# Patient Record
Sex: FEMALE | Race: WHITE | NOT HISPANIC OR LATINO | Employment: OTHER | URBAN - METROPOLITAN AREA
[De-identification: names, ages, dates, MRNs, and addresses within clinical notes are randomized per-mention and may not be internally consistent; named-entity substitution may affect disease eponyms.]

---

## 2021-09-16 ENCOUNTER — NURSE TRIAGE (OUTPATIENT)
Dept: NURSING | Facility: CLINIC | Age: 66
End: 2021-09-16

## 2021-09-16 ENCOUNTER — HOSPITAL ENCOUNTER (EMERGENCY)
Facility: CLINIC | Age: 66
Discharge: ANOTHER HOSPITAL | End: 2021-09-16
Attending: EMERGENCY MEDICINE | Admitting: EMERGENCY MEDICINE
Payer: COMMERCIAL

## 2021-09-16 ENCOUNTER — APPOINTMENT (OUTPATIENT)
Dept: GENERAL RADIOLOGY | Facility: CLINIC | Age: 66
End: 2021-09-16
Attending: EMERGENCY MEDICINE
Payer: COMMERCIAL

## 2021-09-16 ENCOUNTER — TELEPHONE (OUTPATIENT)
Dept: FAMILY MEDICINE | Facility: CLINIC | Age: 66
End: 2021-09-16

## 2021-09-16 ENCOUNTER — APPOINTMENT (OUTPATIENT)
Dept: CT IMAGING | Facility: CLINIC | Age: 66
End: 2021-09-16
Attending: EMERGENCY MEDICINE
Payer: COMMERCIAL

## 2021-09-16 ENCOUNTER — HOSPITAL ENCOUNTER (INPATIENT)
Facility: CLINIC | Age: 66
LOS: 6 days | Discharge: HOME | DRG: 177 | End: 2021-09-22
Attending: INTERNAL MEDICINE | Admitting: INTERNAL MEDICINE
Payer: COMMERCIAL

## 2021-09-16 VITALS
WEIGHT: 199 LBS | DIASTOLIC BLOOD PRESSURE: 59 MMHG | SYSTOLIC BLOOD PRESSURE: 130 MMHG | HEART RATE: 78 BPM | TEMPERATURE: 98.5 F | OXYGEN SATURATION: 93 % | RESPIRATION RATE: 25 BRPM

## 2021-09-16 DIAGNOSIS — J12.82 PNEUMONIA DUE TO 2019 NOVEL CORONAVIRUS: ICD-10-CM

## 2021-09-16 DIAGNOSIS — R09.02 HYPOXEMIA: ICD-10-CM

## 2021-09-16 DIAGNOSIS — U07.1 COVID-19: Primary | ICD-10-CM

## 2021-09-16 DIAGNOSIS — U07.1 PNEUMONIA DUE TO 2019 NOVEL CORONAVIRUS: ICD-10-CM

## 2021-09-16 LAB
ALBUMIN SERPL-MCNC: 3.2 G/DL (ref 3.4–5)
ALP SERPL-CCNC: 39 U/L (ref 40–150)
ALT SERPL W P-5'-P-CCNC: 41 U/L (ref 0–50)
ANION GAP SERPL CALCULATED.3IONS-SCNC: 10 MMOL/L (ref 3–14)
AST SERPL W P-5'-P-CCNC: 40 U/L (ref 0–45)
BASE EXCESS BLDV CALC-SCNC: 3.3 MMOL/L (ref -7.7–1.9)
BASOPHILS # BLD AUTO: 0 10E3/UL (ref 0–0.2)
BASOPHILS NFR BLD AUTO: 0 %
BILIRUB SERPL-MCNC: 0.9 MG/DL (ref 0.2–1.3)
BUN SERPL-MCNC: 13 MG/DL (ref 7–30)
CALCIUM SERPL-MCNC: 8 MG/DL (ref 8.5–10.1)
CHLORIDE BLD-SCNC: 99 MMOL/L (ref 94–109)
CO2 SERPL-SCNC: 24 MMOL/L (ref 20–32)
CREAT SERPL-MCNC: 0.77 MG/DL (ref 0.52–1.04)
CRP SERPL-MCNC: 95.2 MG/L (ref 0–8)
D DIMER PPP FEU-MCNC: 2.12 UG/ML FEU (ref 0–0.5)
EOSINOPHIL # BLD AUTO: 0 10E3/UL (ref 0–0.7)
EOSINOPHIL NFR BLD AUTO: 0 %
ERYTHROCYTE [DISTWIDTH] IN BLOOD BY AUTOMATED COUNT: 13 % (ref 10–15)
FERRITIN SERPL-MCNC: 967 NG/ML (ref 8–252)
GFR SERPL CREATININE-BSD FRML MDRD: 80 ML/MIN/1.73M2
GLUCOSE BLD-MCNC: 101 MG/DL (ref 70–99)
HCO3 BLDV-SCNC: 28 MMOL/L (ref 21–28)
HCT VFR BLD AUTO: 42.4 % (ref 35–47)
HGB BLD-MCNC: 14.5 G/DL (ref 11.7–15.7)
IMM GRANULOCYTES # BLD: 0 10E3/UL
IMM GRANULOCYTES NFR BLD: 0 %
LACTATE SERPL-SCNC: 1.8 MMOL/L (ref 0.7–2)
LDH SERPL L TO P-CCNC: 386 U/L (ref 81–234)
LYMPHOCYTES # BLD AUTO: 1 10E3/UL (ref 0.8–5.3)
LYMPHOCYTES NFR BLD AUTO: 12 %
MAGNESIUM SERPL-MCNC: 2.5 MG/DL (ref 1.6–2.3)
MCH RBC QN AUTO: 30.1 PG (ref 26.5–33)
MCHC RBC AUTO-ENTMCNC: 34.2 G/DL (ref 31.5–36.5)
MCV RBC AUTO: 88 FL (ref 78–100)
MONOCYTES # BLD AUTO: 0.7 10E3/UL (ref 0–1.3)
MONOCYTES NFR BLD AUTO: 8 %
NEUTROPHILS # BLD AUTO: 6.8 10E3/UL (ref 1.6–8.3)
NEUTROPHILS NFR BLD AUTO: 80 %
NRBC # BLD AUTO: 0 10E3/UL
NRBC BLD AUTO-RTO: 0 /100
O2/TOTAL GAS SETTING VFR VENT: 0 %
PCO2 BLDV: 43 MM HG (ref 40–50)
PH BLDV: 7.43 [PH] (ref 7.32–7.43)
PHOSPHATE SERPL-MCNC: 2.8 MG/DL (ref 2.5–4.5)
PLATELET # BLD AUTO: 206 10E3/UL (ref 150–450)
PO2 BLDV: 28 MM HG (ref 25–47)
POTASSIUM BLD-SCNC: 3.8 MMOL/L (ref 3.4–5.3)
PROCALCITONIN SERPL-MCNC: <0.05 NG/ML
PROT SERPL-MCNC: 7.5 G/DL (ref 6.8–8.8)
RBC # BLD AUTO: 4.81 10E6/UL (ref 3.8–5.2)
SARS-COV-2 RNA RESP QL NAA+PROBE: POSITIVE
SODIUM SERPL-SCNC: 133 MMOL/L (ref 133–144)
TROPONIN I SERPL-MCNC: <0.015 UG/L (ref 0–0.04)
WBC # BLD AUTO: 8.5 10E3/UL (ref 4–11)

## 2021-09-16 PROCEDURE — 86140 C-REACTIVE PROTEIN: CPT | Performed by: EMERGENCY MEDICINE

## 2021-09-16 PROCEDURE — 99285 EMERGENCY DEPT VISIT HI MDM: CPT | Performed by: EMERGENCY MEDICINE

## 2021-09-16 PROCEDURE — 250N000011 HC RX IP 250 OP 636: Performed by: EMERGENCY MEDICINE

## 2021-09-16 PROCEDURE — 250N000009 HC RX 250: Performed by: INTERNAL MEDICINE

## 2021-09-16 PROCEDURE — 82803 BLOOD GASES ANY COMBINATION: CPT | Performed by: EMERGENCY MEDICINE

## 2021-09-16 PROCEDURE — 85379 FIBRIN DEGRADATION QUANT: CPT | Performed by: EMERGENCY MEDICINE

## 2021-09-16 PROCEDURE — 258N000003 HC RX IP 258 OP 636: Performed by: INTERNAL MEDICINE

## 2021-09-16 PROCEDURE — 250N000009 HC RX 250: Performed by: EMERGENCY MEDICINE

## 2021-09-16 PROCEDURE — 83605 ASSAY OF LACTIC ACID: CPT | Performed by: EMERGENCY MEDICINE

## 2021-09-16 PROCEDURE — 36415 COLL VENOUS BLD VENIPUNCTURE: CPT | Performed by: EMERGENCY MEDICINE

## 2021-09-16 PROCEDURE — 85025 COMPLETE CBC W/AUTO DIFF WBC: CPT | Performed by: EMERGENCY MEDICINE

## 2021-09-16 PROCEDURE — 96374 THER/PROPH/DIAG INJ IV PUSH: CPT | Mod: 59 | Performed by: EMERGENCY MEDICINE

## 2021-09-16 PROCEDURE — 99223 1ST HOSP IP/OBS HIGH 75: CPT | Mod: AI | Performed by: INTERNAL MEDICINE

## 2021-09-16 PROCEDURE — 71045 X-RAY EXAM CHEST 1 VIEW: CPT

## 2021-09-16 PROCEDURE — 82728 ASSAY OF FERRITIN: CPT | Performed by: EMERGENCY MEDICINE

## 2021-09-16 PROCEDURE — 83735 ASSAY OF MAGNESIUM: CPT | Performed by: EMERGENCY MEDICINE

## 2021-09-16 PROCEDURE — 84484 ASSAY OF TROPONIN QUANT: CPT | Performed by: EMERGENCY MEDICINE

## 2021-09-16 PROCEDURE — C9803 HOPD COVID-19 SPEC COLLECT: HCPCS | Performed by: EMERGENCY MEDICINE

## 2021-09-16 PROCEDURE — 99291 CRITICAL CARE FIRST HOUR: CPT | Mod: 25 | Performed by: EMERGENCY MEDICINE

## 2021-09-16 PROCEDURE — 84100 ASSAY OF PHOSPHORUS: CPT | Performed by: EMERGENCY MEDICINE

## 2021-09-16 PROCEDURE — 87635 SARS-COV-2 COVID-19 AMP PRB: CPT | Performed by: EMERGENCY MEDICINE

## 2021-09-16 PROCEDURE — 120N000001 HC R&B MED SURG/OB

## 2021-09-16 PROCEDURE — 71275 CT ANGIOGRAPHY CHEST: CPT | Mod: MG

## 2021-09-16 PROCEDURE — 84145 PROCALCITONIN (PCT): CPT | Performed by: EMERGENCY MEDICINE

## 2021-09-16 PROCEDURE — 83615 LACTATE (LD) (LDH) ENZYME: CPT | Performed by: EMERGENCY MEDICINE

## 2021-09-16 PROCEDURE — 82040 ASSAY OF SERUM ALBUMIN: CPT | Performed by: EMERGENCY MEDICINE

## 2021-09-16 PROCEDURE — XW043E5 INTRODUCTION OF REMDESIVIR ANTI-INFECTIVE INTO CENTRAL VEIN, PERCUTANEOUS APPROACH, NEW TECHNOLOGY GROUP 5: ICD-10-PCS | Performed by: HOSPITALIST

## 2021-09-16 RX ORDER — DEXAMETHASONE SODIUM PHOSPHATE 10 MG/ML
6 INJECTION, SOLUTION INTRAMUSCULAR; INTRAVENOUS ONCE
Status: COMPLETED | OUTPATIENT
Start: 2021-09-16 | End: 2021-09-16

## 2021-09-16 RX ORDER — IOPAMIDOL 755 MG/ML
500 INJECTION, SOLUTION INTRAVASCULAR ONCE
Status: COMPLETED | OUTPATIENT
Start: 2021-09-16 | End: 2021-09-16

## 2021-09-16 RX ORDER — LIDOCAINE 40 MG/G
CREAM TOPICAL
Status: DISCONTINUED | OUTPATIENT
Start: 2021-09-16 | End: 2021-09-22 | Stop reason: HOSPADM

## 2021-09-16 RX ORDER — ONDANSETRON 4 MG/1
4 TABLET, ORALLY DISINTEGRATING ORAL ONCE
Status: COMPLETED | OUTPATIENT
Start: 2021-09-16 | End: 2021-09-16

## 2021-09-16 RX ADMIN — SODIUM CHLORIDE 50 ML: 9 INJECTION, SOLUTION INTRAVENOUS at 23:54

## 2021-09-16 RX ADMIN — DEXAMETHASONE SODIUM PHOSPHATE 6 MG: 10 INJECTION, SOLUTION INTRAMUSCULAR; INTRAVENOUS at 17:02

## 2021-09-16 RX ADMIN — SODIUM CHLORIDE 70 ML: 9 INJECTION, SOLUTION INTRAVENOUS at 15:58

## 2021-09-16 RX ADMIN — ONDANSETRON 4 MG: 4 TABLET, ORALLY DISINTEGRATING ORAL at 20:37

## 2021-09-16 RX ADMIN — IOPAMIDOL 75 ML: 755 INJECTION, SOLUTION INTRAVENOUS at 15:59

## 2021-09-16 RX ADMIN — REMDESIVIR 200 MG: 100 INJECTION, POWDER, LYOPHILIZED, FOR SOLUTION INTRAVENOUS at 23:40

## 2021-09-16 ASSESSMENT — ACTIVITIES OF DAILY LIVING (ADL)
FALL_HISTORY_WITHIN_LAST_SIX_MONTHS: NO
DIFFICULTY_EATING/SWALLOWING: NO
DIFFICULTY_COMMUNICATING: NO
DRESSING/BATHING_DIFFICULTY: NO
WEAR_GLASSES_OR_BLIND: NO
WALKING_OR_CLIMBING_STAIRS_DIFFICULTY: NO
TOILETING_ISSUES: NO
PATIENT_/_FAMILY_COMMUNICATION_STYLE: SPOKEN LANGUAGE (ENGLISH OR BILINGUAL)
CONCENTRATING,_REMEMBERING_OR_MAKING_DECISIONS_DIFFICULTY: NO
DOING_ERRANDS_INDEPENDENTLY_DIFFICULTY: NO
HEARING_DIFFICULTY_OR_DEAF: NO

## 2021-09-16 ASSESSMENT — MIFFLIN-ST. JEOR: SCORE: 1468.47

## 2021-09-16 NOTE — ED NOTES
Pt resting comfortably . Denies pain. Vss stable . On 02 at 3 liters per nasal cannula. Not restless.

## 2021-09-16 NOTE — TELEPHONE ENCOUNTER
3-Hospitalist Huddle Documentation    Acuity/Preferred Bed Type: medical floor  Infection Concerns: COVID  Additional Specialist Needed Or Specialist Already Contacted:   None  Timely Treatments Needed: No  Important things to know/address during hospitalization: sitter not needed     Clinic: [facility]  Provider: Dr. Bee  Dx: COVID positive    Exposed to grandson and feeling sick since.     Sats 87-88% requiring 3 litres.    Cough, decreased appetite.    CT PE- shows covid pneumonia    Got Decadron.

## 2021-09-16 NOTE — TELEPHONE ENCOUNTER
Daughter calling re: patient who she thinks may have COVID-19. Symptoms: fatigue, drinking fluids, can't have a conversation w/o coughing, 02 sat 82-88%, headache, does not look bluish but looks pale, dizzy when she gets up to walk; current /86 Pulse 89 O2 81%, had a fever previously but not today. Patient did not get vaccinated. Patient does not have a PCP.    Disposition: Go to the ED now  Reviewed care advice with caller per RN triage protocol guideline.  Advised to call back with worsening symptoms, concerns or questions. Caller verbalized understanding and will take patient to the [place] ER.        Reason for Disposition    MILD difficulty breathing (e.g., minimal/no SOB at rest, SOB with walking, pulse <100)    Additional Information    Negative: SEVERE difficulty breathing (e.g., struggling for each breath, speaks in single words)    Negative: Difficult to awaken or acting confused (e.g., disoriented, slurred speech)    Negative: Bluish (or gray) lips or face now    Negative: Shock suspected (e.g., cold/pale/clammy skin, too weak to stand, low BP, rapid pulse)    Negative: Sounds like a life-threatening emergency to the triager    Negative: SEVERE or constant chest pain or pressure (Exception: mild central chest pain, present only when coughing)    Negative: MODERATE difficulty breathing (e.g., speaks in phrases, SOB even at rest, pulse 100-120)    Negative: [1] Headache AND [2] stiff neck (can't touch chin to chest)    Protocols used: CORONAVIRUS (COVID-19) DIAGNOSED OR [id number] 3.25

## 2021-09-16 NOTE — ED NOTES
Pt denies pain. Pt tolerating water. Pt watching TV. Report given to Aniya Glover. Will call transport. 02 sats stable on 3 liters per nasal cannula.

## 2021-09-17 LAB
ANION GAP SERPL CALCULATED.3IONS-SCNC: 6 MMOL/L (ref 3–14)
BUN SERPL-MCNC: 14 MG/DL (ref 7–30)
CALCIUM SERPL-MCNC: 8.6 MG/DL (ref 8.5–10.1)
CHLORIDE BLD-SCNC: 103 MMOL/L (ref 94–109)
CO2 SERPL-SCNC: 29 MMOL/L (ref 20–32)
CREAT SERPL-MCNC: 0.67 MG/DL (ref 0.52–1.04)
CRP SERPL-MCNC: 81.9 MG/L (ref 0–8)
ERYTHROCYTE [DISTWIDTH] IN BLOOD BY AUTOMATED COUNT: 12.8 % (ref 10–15)
GFR SERPL CREATININE-BSD FRML MDRD: >90 ML/MIN/1.73M2
GLUCOSE BLD-MCNC: 133 MG/DL (ref 70–99)
HCT VFR BLD AUTO: 43.2 % (ref 35–47)
HGB BLD-MCNC: 14 G/DL (ref 11.7–15.7)
MCH RBC QN AUTO: 29.7 PG (ref 26.5–33)
MCHC RBC AUTO-ENTMCNC: 32.4 G/DL (ref 31.5–36.5)
MCV RBC AUTO: 92 FL (ref 78–100)
PLATELET # BLD AUTO: 230 10E3/UL (ref 150–450)
POTASSIUM BLD-SCNC: 3.8 MMOL/L (ref 3.4–5.3)
RBC # BLD AUTO: 4.71 10E6/UL (ref 3.8–5.2)
SODIUM SERPL-SCNC: 138 MMOL/L (ref 133–144)
WBC # BLD AUTO: 5.9 10E3/UL (ref 4–11)

## 2021-09-17 PROCEDURE — 94640 AIRWAY INHALATION TREATMENT: CPT | Mod: 76

## 2021-09-17 PROCEDURE — 94640 AIRWAY INHALATION TREATMENT: CPT

## 2021-09-17 PROCEDURE — 120N000001 HC R&B MED SURG/OB

## 2021-09-17 PROCEDURE — 999N000157 HC STATISTIC RCP TIME EA 10 MIN

## 2021-09-17 PROCEDURE — 250N000013 HC RX MED GY IP 250 OP 250 PS 637: Performed by: INTERNAL MEDICINE

## 2021-09-17 PROCEDURE — 85027 COMPLETE CBC AUTOMATED: CPT | Performed by: INTERNAL MEDICINE

## 2021-09-17 PROCEDURE — 250N000009 HC RX 250: Performed by: INTERNAL MEDICINE

## 2021-09-17 PROCEDURE — 86140 C-REACTIVE PROTEIN: CPT | Performed by: INTERNAL MEDICINE

## 2021-09-17 PROCEDURE — 80048 BASIC METABOLIC PNL TOTAL CA: CPT | Performed by: INTERNAL MEDICINE

## 2021-09-17 PROCEDURE — 36415 COLL VENOUS BLD VENIPUNCTURE: CPT | Performed by: INTERNAL MEDICINE

## 2021-09-17 PROCEDURE — 250N000012 HC RX MED GY IP 250 OP 636 PS 637: Performed by: INTERNAL MEDICINE

## 2021-09-17 PROCEDURE — 250N000011 HC RX IP 250 OP 636: Performed by: INTERNAL MEDICINE

## 2021-09-17 PROCEDURE — 258N000003 HC RX IP 258 OP 636: Performed by: INTERNAL MEDICINE

## 2021-09-17 PROCEDURE — 99233 SBSQ HOSP IP/OBS HIGH 50: CPT | Performed by: INTERNAL MEDICINE

## 2021-09-17 RX ORDER — PANTOPRAZOLE SODIUM 40 MG/1
40 TABLET, DELAYED RELEASE ORAL
Status: DISCONTINUED | OUTPATIENT
Start: 2021-09-18 | End: 2021-09-22 | Stop reason: HOSPADM

## 2021-09-17 RX ORDER — ALBUTEROL SULFATE 90 UG/1
2 AEROSOL, METERED RESPIRATORY (INHALATION) 4 TIMES DAILY
Status: DISCONTINUED | OUTPATIENT
Start: 2021-09-17 | End: 2021-09-22 | Stop reason: HOSPADM

## 2021-09-17 RX ORDER — ALBUTEROL SULFATE 90 UG/1
2 AEROSOL, METERED RESPIRATORY (INHALATION) 4 TIMES DAILY PRN
Status: DISCONTINUED | OUTPATIENT
Start: 2021-09-17 | End: 2021-09-22 | Stop reason: HOSPADM

## 2021-09-17 RX ADMIN — REMDESIVIR 100 MG: 100 INJECTION, POWDER, LYOPHILIZED, FOR SOLUTION INTRAVENOUS at 16:19

## 2021-09-17 RX ADMIN — ENOXAPARIN SODIUM 40 MG: 40 INJECTION SUBCUTANEOUS at 09:53

## 2021-09-17 RX ADMIN — SODIUM CHLORIDE 50 ML: 9 INJECTION, SOLUTION INTRAVENOUS at 16:20

## 2021-09-17 RX ADMIN — ALBUTEROL SULFATE 2 PUFF: 90 AEROSOL, METERED RESPIRATORY (INHALATION) at 15:06

## 2021-09-17 RX ADMIN — ALBUTEROL SULFATE 2 PUFF: 90 AEROSOL, METERED RESPIRATORY (INHALATION) at 20:26

## 2021-09-17 RX ADMIN — ALBUTEROL SULFATE 2 PUFF: 90 AEROSOL, METERED RESPIRATORY (INHALATION) at 11:09

## 2021-09-17 RX ADMIN — DEXAMETHASONE 6 MG: 2 TABLET ORAL at 08:12

## 2021-09-17 RX ADMIN — OMEPRAZOLE 20 MG: 20 CAPSULE, DELAYED RELEASE ORAL at 06:52

## 2021-09-17 ASSESSMENT — ACTIVITIES OF DAILY LIVING (ADL)
ADLS_ACUITY_SCORE: 5

## 2021-09-17 NOTE — H&P
[facility]    Hospitalist History and Physical    Name: Sabina Rm    MRN: [id number]  YOB: 1955    Age: 66 year old  Date of Admission:  9/16/2021  Date of Service (when I saw the patient): 9/16/2021    Assessment & Plan   Sabina Rm is a 66 year old female with no significant comorbidities presents to the emergency room with shortness of breath after recent diagnosis of COVID-19.  Patient was hypoxic and is being admitted for acute respiratory failure with hypoxia.  She was seen in [facility] ER was admitted directly for further evaluation and treatment.    Acute respiratory failure with COVID-19 pneumonitis  -Elevated D-dimer with CT chest showing no PE but bilateral pneumonitis, CRP 95  -Patient is not vaccinated  -Exposed by her grandson 9 /3 diagnosed on 9/ 7   - presented to the ER if symptoms not getting worse continue shortness of breath and cough  -Found to be hypoxic requiring 3 L initially now up to 4 L   -Started on Decadron-  -IV remdesivir  -Enoxaparin for anticoagulation  -Omeprazole for GI prophylaxis  -Supplemental O2 wean as able      DVT Prophylaxis: Enoxaparin (Lovenox) SQ  Code Status: Full Code    Disposition: Admitted as inpatient  Primary Care Physician   [facility]    Chief Complaint   Direct admission from [facility] ER for cough shortness of breath found to be hypoxic    History is obtained from the patient    History of Present Illness   Sabina Rm is a 66 year old female not vaccinated, exposed to COVID-19, diagnosed with COVID-19 about 10 days ago presented to the emergency room with ongoing cough and shortness of breath in the emergency room was found to be hypoxic requiring supplemental O2 was recommended admission and transferred as a direct admit to be admitted here.  Continues to have some cough and shortness of breath denies any chest pain no pleuritic chest pain pressure heaviness or tightness has  had fevers and generalized malaise and weakness.  No nausea no vomiting no abdominal pain no dysuria no back pain.  More than 10 point review of systems was carried out was otherwise negative.    Past Medical History    No past medical history on file.    Denies any medical chronic diagnoses        Past Surgical History   No past surgical history on file.    Prior to Admission Medications   None     Allergies   Allergies   Allergen Reactions     Aspirin Anaphylaxis       Social History   Social History     Tobacco Use     Smoking status: Not on file   Substance Use Topics     Alcohol use: Not on file     Social History     Social History Narrative     Not on file     Denies smoking uses alcohol socially lives alone    Family History   Mom had diabetes mellitus    Review of Systems   A Comprehensive greater than 10 system review of systems was carried out.  Pertinent positives and negatives are noted above.  Otherwise negative for contributory information.    Physical Exam   Temp: 99.4  F (37.4  C) Temp src: Oral BP: (!) 150/67 Pulse: 74   Resp: 18 SpO2: 92 % O2 Device: Nasal cannula Oxygen Delivery: 4 LPM  Vital Signs with Ranges  Temp:  [98.5  F (36.9  C)-99.4  F (37.4  C)] 99.4  F (37.4  C)  Pulse:  [74-94] 74  Resp:  [12-50] 18  BP: (128-162)/(59-84) 150/67  SpO2:  [87 %-96 %] 92 %  202 lbs 1.6 oz    GEN:  Alert, oriented x 3, appears comfortable, no overt distress  HEENT:  Normocephalic/atraumatic, no scleral icterus, no nasal discharge, mouth moist.  CV:  Regular rate and rhythm, no murmur or JVD.  S1 + S2 noted, no S3 or S4.  LUNGS: Bibasilar crackles s.  Symmetric chest rise on inhalation noted.  ABD:  Active bowel sounds, soft, non-tender/non-distended.  No rebound/guarding/rigidity.  EXT:  No edema.  No cyanosis.  No joint synovitis noted.  SKIN:  Dry to touch  NEURO:  Symmetric muscle strength No new focal deficits appreciated.    Data   Data reviewed today:  I personally reviewed the chest CT image(s)  showing Bilateral infiltrates.    Recent Labs   Lab 09/16/21  1405   PHV 7.43   PO2V 28   PCO2V 43   HCO3V 28     Recent Labs   Lab 09/16/21  1405   WBC 8.5   HGB 14.5   HCT 42.4   MCV 88        Recent Labs   Lab 09/16/21  1405      POTASSIUM 3.8   CHLORIDE 99   CO2 24   ANIONGAP 10   *   BUN 13   CR 0.77   GFRESTIMATED 80   TANYA 8.0*     No results for input(s): CULT in the last 168 hours.  No results for input(s): NTBNPI, NTBNP in the last 168 hours.  GFR Estimate   Date Value Ref Range Status   09/16/2021 80 >60 mL/min/1.73m2 Final     Comment:     As of July 11, 2021, eGFR is calculated by the CKD-EPI creatinine equation, without race adjustment. eGFR can be influenced by muscle mass, exercise, and diet. The reported eGFR is an estimation only and is only applicable if the renal function is stable.     No results found for: GFRESTBLACK  Recent Labs   Lab 09/16/21  1405   *     Recent Labs   Lab 09/16/21  1405   HGB 14.5     Recent Labs   Lab 09/16/21  1405   AST 40   ALT 41   ALKPHOS 39*   BILITOTAL 0.9     No results for input(s): INR in the last 168 hours.  Recent Labs   Lab 09/16/21  1405   LACT 1.8     No results for input(s): LIPASE in the last 168 hours.  Recent Labs   Lab 09/16/21  1405   BUN 13   CR 0.77     No results for input(s): TSH in the last 168 hours.  Recent Labs   Lab 09/16/21  1405   TROPONIN <0.015     No results for input(s): COLOR, APPEARANCE, URINEGLC, URINEBILI, URINEKETONE, SG, UBLD, URINEPH, PROTEIN, UROBILINOGEN, NITRITE, LEUKEST, RBCU, WBCU in the last 168 hours.    Recent Results (from the past 24 hour(s))   XR Chest Port 1 View    Narrative    CHEST PORTABLE ONE VIEW   9/16/2021 2:01 PM     HISTORY: Dyspnea/shortness of breath.    COMPARISON: None.      Impression    IMPRESSION: Portable chest. Patchy airspace opacities are noted  bilaterally concerning for viral pneumonitis in the correct clinical  setting. No pneumothorax or pleural effusions. Heart is  normal in  size.    VASILE QURESHI MD         SYSTEM ID:  [id number]   CT Chest Pulmonary Embolism w Contrast    Narrative    CT CHEST PULMONARY EMBOLISM WITH CONTRAST 9/16/2021 4:18 PM    CLINICAL HISTORY: COVID positive, shortness of breath, elevated  D-dimer.    TECHNIQUE: CT angiogram chest during arterial phase injection IV  contrast. 2D and 3D MIP reconstructions were performed by the CT  technologist. Dose reduction techniques were used.     CONTRAST: 90 mL Isovue 370    COMPARISON: None.    FINDINGS:  ANGIOGRAM CHEST: Pulmonary arteries are normal caliber and negative  for pulmonary emboli. Thoracic aorta is negative for dissection. No CT  evidence of right heart strain.    LUNGS AND PLEURA: There are mixed groundglass and airspace infiltrates  throughout both lungs, consistent with COVID-19 pneumonia. No pleural  effusion.    MEDIASTINUM/AXILLAE: No lymphadenopathy. No coronary artery  calcification.    UPPER ABDOMEN: Normal.    MUSCULOSKELETAL: Normal.      Impression    IMPRESSION:  1.  Findings consistent with COVID-19 pneumonia.  2.  No evidence of pulmonary embolus.    PAULA KU MD         SYSTEM ID:  [id number]

## 2021-09-17 NOTE — ED NOTES
Pt given zofran for intermittent nausea. Pt transport to [facility] per EMS. Pt coughing intermittently . Vss stable.

## 2021-09-17 NOTE — PLAN OF CARE
For vital signs and complete assessments, please see documentation flowsheets.     Pertinent assessments: A&O x4. Denies pain. Reports SOB @ rest, O2 saturations maintained on 4 lpm. LS crackles, infrequent non productive cough. Up independently, steady. First dose of remdesivir administered.   Major Shift Events: Admitted to floor from [place].   Treatment Plan: COVID-19 cares - Remdesivir, decadron, enoxaparin supplemental oxygen  Bedside Nurse: Nita Warren RN       Addendum: 0240: Pt diaphoretic, O2 needs significantly increased after ambulating to bathroom.   Supplemental oxygen increased to 10 lpm w/sats still @ 88-89%. RT paged and instructed to prone pt   & increase oxygen to 15 lpm.   0330: Pt remains in prone position, saturations improved, oxygen weaned to 10 lpm via oxymask

## 2021-09-17 NOTE — PROGRESS NOTES
[facility]  Hospitalist Progress Note    Assessment & Plan   Sabina Cayla Rm is a 66 year old healthy female who was admitted on 9/16/2021 with SOB and diagnosed with acute hypoxic respiratory failure associated with COVID-19 pneumonia. Patient is unvaccinated.     Patient was initially seen in [facility] ED and was transferred to [facility] for ongoing cares.     Acute hypoxic respiratory failure associated with acute COVID-19 pneumonitis: CRP 95. D-dimer 2.12. CTA negative for PE. Procal negative.   -COVID-19 positive 9/16/21 (reported positive 9/3/21); exposure 9/3/21  -Remdesivir started 9/16 - present; 5 day course  -Dexamethasone 9/16 - present; 10 day course   -GI prophylaxis: PPI  -Anticoagulation: Lovenox  -Oxygen: Worsening oxygen needs. Transition to HFNC. Prone as tolerated. Encourage pulmonary hygiene with IC, albuterol inhaler  -Supportive cares    FEN: Oral hydration; monitor; regular   Activity: As tolerated  DVT Prophylaxis: Enoxaparin (Lovenox) SQ  Family update: Patient to update family     Code Status: Full Code    Disposition: 2-3 days pending improvement in respiratory status.     Text Page (7am - 6pm, M-F)    Interval History   Doing ok. No dizziness. Oxygen drops quickly with activity. SOB with activity. No cough or sputum production. OK eating/drinking. Afebrile. No chest pain or palpitations. No calf tenderness. Communicates easily. Discussed with nursing.     -Data reviewed today: I reviewed all new labs and imaging results over the last 24 hours. I personally reviewed:     Physical Exam   Temp: 97.8  F (36.6  C) Temp src: Axillary BP: 114/50 Pulse: 67   Resp: 18 SpO2: 92 % O2 Device: Oxymask Oxygen Delivery: 10 LPM  Vitals:    09/16/21 2205   Weight: 91.7 kg (202 lb 1.6 oz)     Vital Signs with Ranges  Temp:  [97.8  F (36.6  C)-99.4  F (37.4  C)] 97.8  F (36.6  C)  Pulse:  [66-94] 67  Resp:  [12-50] 18  BP: (114-162)/(50-84) 114/50  SpO2:  [86 %-96 %] 92 %  No  intake/output data recorded.    Constitutional: Awake, alert, cooperative, no apparent distress. Ill in appearance. Non-toxic. Appears stated age.   HEENT: Atraumatic. Normocephalic. Conjunctiva non-injected. Sclera anicteric. MMM.   Respiratory: Moves air bilaterally. Diminished throughout. No wheezing or rales.   Cardiovascular: Regular rate and rhythm, normal S1 and S2, and no murmur noted  GI: Normal bowel sounds, soft, non-distended, non-tender  Skin/Integumen: No rashes, no cyanosis, no edema    Medications     - MEDICATION INSTRUCTIONS -         remdesivir  100 mg Intravenous Q24H    And     sodium chloride 0.9%  50 mL Intravenous Q24H     albuterol  2 puff Inhalation 4x Daily     dexamethasone  6 mg Oral Daily     enoxaparin ANTICOAGULANT  40 mg Subcutaneous Q24H     [START ON 9/18/2021] pantoprazole  40 mg Oral QAM AC     sodium chloride (PF)  3 mL Intracatheter Q8H     Data   Recent Labs   Lab 09/17/21  0746 09/16/21  1405   WBC 5.9 8.5   HGB 14.0 14.5   MCV 92 88    206    133   POTASSIUM 3.8 3.8   CHLORIDE 103 99   CO2 29 24   BUN 14 13   CR 0.67 0.77   ANIONGAP 6 10   TANYA 8.6 8.0*   * 101*   ALBUMIN  --  3.2*   PROTTOTAL  --  7.5   BILITOTAL  --  0.9   ALKPHOS  --  39*   ALT  --  41   AST  --  40   TROPONIN  --  <0.015     Recent Results (from the past 24 hour(s))   XR Chest Port 1 View    Narrative    CHEST PORTABLE ONE VIEW   9/16/2021 2:01 PM     HISTORY: Dyspnea/shortness of breath.    COMPARISON: None.      Impression    IMPRESSION: Portable chest. Patchy airspace opacities are noted  bilaterally concerning for viral pneumonitis in the correct clinical  setting. No pneumothorax or pleural effusions. Heart is normal in  size.    VASILE QURESHI MD         SYSTEM ID:  [id number]   CT Chest Pulmonary Embolism w Contrast    Narrative    CT CHEST PULMONARY EMBOLISM WITH CONTRAST 9/16/2021 4:18 PM    CLINICAL HISTORY: COVID positive, shortness of breath, elevated  D-dimer.    TECHNIQUE: CT  angiogram chest during arterial phase injection IV  contrast. 2D and 3D MIP reconstructions were performed by the CT  technologist. Dose reduction techniques were used.     CONTRAST: 90 mL Isovue 370    COMPARISON: None.    FINDINGS:  ANGIOGRAM CHEST: Pulmonary arteries are normal caliber and negative  for pulmonary emboli. Thoracic aorta is negative for dissection. No CT  evidence of right heart strain.    LUNGS AND PLEURA: There are mixed groundglass and airspace infiltrates  throughout both lungs, consistent with COVID-19 pneumonia. No pleural  effusion.    MEDIASTINUM/AXILLAE: No lymphadenopathy. No coronary artery  calcification.    UPPER ABDOMEN: Normal.    MUSCULOSKELETAL: Normal.      Impression    IMPRESSION:  1.  Findings consistent with COVID-19 pneumonia.  2.  No evidence of pulmonary embolus.    PAULA KU MD         SYSTEM ID:  [id number]

## 2021-09-17 NOTE — PHARMACY-ADMISSION MEDICATION HISTORY
Admission medication history interview status for this patient is complete. See [facility] admission navigator for allergy information, prior to admission medications and immunization status.     Medication history interview done, indicate source(s): Patient    Prior to Admission medications    None

## 2021-09-17 NOTE — PLAN OF CARE
To Do:  End of Shift Summary  For vital signs and complete assessments, please see documentation flowsheets.     Pertinent assessments: A&O x4. Denies pain. Reports SOB @ rest, O2 saturations dipped when ambulating to the bathroom, and did not return at rest. O2 is now running at 10L/min and patient is proned, O2 sats remain in low 90s. LS crackles, infrequent non productive cough.     Major Shift Events: O2 titration.    Treatment Plan: COVID-19 cares - Remdesivir, decadron, enoxaparin supplemental oxygen.    Bedside Nurse: Diane Garcia RN

## 2021-09-17 NOTE — ED PROVIDER NOTES
History     Chief Complaint   Patient presents with     Shortness of Breath     HPI  History per patient and medical records    This is a 66-year-old female, otherwise healthy, presenting with shortness of breath.  Patient was exposed to Covid by her grandson on 9/3/2021.  She has had viral symptoms for the last 10 days.  She initially had fevers and chills but that has resolved.  She primarily has symptoms currently of shortness of breath, cough with some mucus, decreased appetite and weakness.  She is also very fatigued.  She has some nausea when upright but has not been vomiting.  She has had some loose stools.  No skin changes or rash.  Normal urination.  She is not Covid immunized.  No history of COPD, asthma, lung disease.  She is not diabetic.  Patient does not smoke.    Allergies:  Allergies   Allergen Reactions     Aspirin Anaphylaxis       Problem List:    Patient Active Problem List    Diagnosis Date Noted     COVID-19 09/16/2021     Priority: Medium        Past Medical History:    No past medical history on file.    Past Surgical History:    No past surgical history on file.    Family History:    No family history on file.    Social History:  Marital Status:  Single [1]  Social History     Tobacco Use     Smoking status: Not on file   Substance Use Topics     Alcohol use: Not on file     Drug use: Not on file        Medications:    No current outpatient medications on file.        Review of Systems  All other ROS reviewed and are negative or non-contributory except as stated in HPI.    Physical Exam   BP: 130/81  Pulse: 94  Temp: 98.5  F (36.9  C)  Resp: 18  Weight: 90.3 kg (199 lb)  SpO2: 91 %      Physical Exam  Vitals and nursing note reviewed.   Constitutional:       Comments: Pleasant, ill-appearing female sitting in the bed   HENT:      Head: Normocephalic.      Right Ear: Tympanic membrane and ear canal normal.      Left Ear: Tympanic membrane and ear canal normal.      Nose: Nose normal.       Mouth/Throat:      Pharynx: Oropharynx is clear.      Comments: Slightly tacky mucous membranes  Eyes:      Extraocular Movements: Extraocular movements intact.      Pupils: Pupils are equal, round, and reactive to light.      Comments: Mild bilateral conjunctival injection   Cardiovascular:      Rate and Rhythm: Normal rate and regular rhythm.      Pulses: Normal pulses.      Heart sounds: Normal heart sounds.   Pulmonary:      Effort: Pulmonary effort is normal.      Breath sounds: Normal breath sounds.      Comments: Intermittent dry cough  Abdominal:      Palpations: Abdomen is soft.      Tenderness: There is no abdominal tenderness.   Musculoskeletal:         General: No tenderness. Normal range of motion.      Cervical back: Normal range of motion and neck supple.      Right lower leg: No edema.      Left lower leg: No edema.   Skin:     General: Skin is warm and dry.      Findings: No rash.   Neurological:      General: No focal deficit present.      Mental Status: She is alert and oriented to person, place, and time.   Psychiatric:         Mood and Affect: Mood normal.         Behavior: Behavior normal.         ED Course (with Medical Decision Making)    Pt seen and examined by me.  RN and EPIC notes reviewed.       Patient with Covid exposure, Covid symptoms for about 10 days.  Currently very fatigued, weak, with shortness of breath and cough.  On arrival to the ED her O2 sats were 87 to 88% on room air.  She was placed on O2 per nasal cannula with improvement in oxygen saturations.  I am going to check labs and obviously Covid.  Chest x-ray.  Patient's electrolytes and LFTs are unremarkable.  Lactate is normal.  VBG unremarkable.  Mild elevation in LDH, ferritin.  D-dimer elevated.  Normal white count.    Covid swab is positive.    Chest x-ray shows patchy bilateral airspace opacity concerning for viral pneumonitis.    With elevated D-dimer, CT scan of the chest was done and this showed findings consistent  with COVID-19 pneumonia.  Patient received Decadron in the ED.  Tylenol for a headache.    Results were discussed with her.  Because of her O2 needs and inflammatory markers I would have her admitted for further management.  Unfortunately, no beds available in this facility.  We were unable to get a bed at [facility].  I have spoken with the hospitalist service.  Patient aware of the plan and she is stable.        Procedures      Results for orders placed or performed during the hospital encounter of 09/16/21 (from the past 24 hour(s))   XR Chest Port 1 View    Narrative    CHEST PORTABLE ONE VIEW   9/16/2021 2:01 PM     HISTORY: Dyspnea/shortness of breath.    COMPARISON: None.      Impression    IMPRESSION: Portable chest. Patchy airspace opacities are noted  bilaterally concerning for viral pneumonitis in the correct clinical  setting. No pneumothorax or pleural effusions. Heart is normal in  size.    VASILE QURESHI MD         SYSTEM ID:  [id number]   CBC with platelets differential    Narrative    The following orders were created for panel order CBC with platelets differential.  Procedure                               Abnormality         Status                     ---------                               -----------         ------                     CBC with platelets and d...[[id number]]                      Final result                 Please view results for these tests on the individual orders.   Comprehensive metabolic panel   Result Value Ref Range    Sodium 133 133 - 144 mmol/L    Potassium 3.8 3.4 - 5.3 mmol/L    Chloride 99 94 - 109 mmol/L    Carbon Dioxide (CO2) 24 20 - 32 mmol/L    Anion Gap 10 3 - 14 mmol/L    Urea Nitrogen 13 7 - 30 mg/dL    Creatinine 0.77 0.52 - 1.04 mg/dL    Calcium 8.0 (L) 8.5 - 10.1 mg/dL    Glucose 101 (H) 70 - 99 mg/dL    Alkaline Phosphatase 39 (L) 40 - 150 U/L    AST 40 0 - 45 U/L    ALT 41 0 - 50 U/L    Protein Total 7.5 6.8 - 8.8 g/dL    Albumin 3.2 (L) 3.4 - 5.0  g/dL    Bilirubin Total 0.9 0.2 - 1.3 mg/dL    GFR Estimate 80 >60 mL/min/1.73m2   Magnesium   Result Value Ref Range    Magnesium 2.5 (H) 1.6 - 2.3 mg/dL   Lactic acid whole blood   Result Value Ref Range    Lactic Acid 1.8 0.7 - 2.0 mmol/L   Blood gas venous   Result Value Ref Range    pH Venous 7.43 7.32 - 7.43    pCO2 Venous 43 40 - 50 mm Hg    pO2 Venous 28 25 - 47 mm Hg    Bicarbonate Venous 28 21 - 28 mmol/L    Base Excess/Deficit (+/-) 3.3 (H) -7.7 - 1.9 mmol/L    FIO2 0    Troponin I   Result Value Ref Range    Troponin I <0.015 0.000 - 0.045 ug/L   Lactate Dehydrogenase   Result Value Ref Range    Lactate Dehydrogenase 386 (H) 81 - 234 U/L   Phosphorus   Result Value Ref Range    Phosphorus 2.8 2.5 - 4.5 mg/dL   Procalcitonin   Result Value Ref Range    Procalcitonin <0.05 <5.00 ng/mL   CRP inflammation   Result Value Ref Range    CRP Inflammation 95.2 (H) 0.0 - 8.0 mg/L   Ferritin   Result Value Ref Range    Ferritin 967 (H) 8 - 252 ng/mL   D dimer quantitative   Result Value Ref Range    D-Dimer Quantitative 2.12 (H) 0.00 - 0.50 ug/mL FEU    Narrative    This D-dimer assay is intended for use in conjunction with a clinical pretest probability assessment model to exclude pulmonary embolism (PE) and deep venous thrombosis (DVT) in outpatients suspected of PE or DVT. The cut-off value is 0.50 ug/mL FEU.   CBC with platelets and differential   Result Value Ref Range    WBC Count 8.5 4.0 - 11.0 10e3/uL    RBC Count 4.81 3.80 - 5.20 10e6/uL    Hemoglobin 14.5 11.7 - 15.7 g/dL    Hematocrit 42.4 35.0 - 47.0 %    MCV 88 78 - 100 fL    MCH 30.1 26.5 - 33.0 pg    MCHC 34.2 31.5 - 36.5 g/dL    RDW 13.0 10.0 - 15.0 %    Platelet Count 206 150 - 450 10e3/uL    % Neutrophils 80 %    % Lymphocytes 12 %    % Monocytes 8 %    % Eosinophils 0 %    % Basophils 0 %    % Immature Granulocytes 0 %    NRBCs per 100 WBC 0 <1 /100    Absolute Neutrophils 6.8 1.6 - 8.3 10e3/uL    Absolute Lymphocytes 1.0 0.8 - 5.3 10e3/uL     Absolute Monocytes 0.7 0.0 - 1.3 10e3/uL    Absolute Eosinophils 0.0 0.0 - 0.7 10e3/uL    Absolute Basophils 0.0 0.0 - 0.2 10e3/uL    Absolute Immature Granulocytes 0.0 <=0.0 10e3/uL    Absolute NRBCs 0.0 10e3/uL   Symptomatic COVID-19 Virus (Coronavirus) by PCR Nasopharyngeal    Specimen: Nasopharyngeal; Swab   Result Value Ref Range    SARS CoV2 PCR Positive (A) Negative    Narrative    Testing was performed using the abram  SARS-CoV-2 & Influenza A/B Assay on the abram  Karnia  System.  This test should be ordered for the detection of SARS-COV-2 in individuals who meet SARS-CoV-2 clinical and/or epidemiological criteria. Test performance is unknown in asymptomatic patients.  This test is for in vitro diagnostic use under the FDA EUA for laboratories certified under CLIA to perform moderate and/or high complexity testing. This test has not been FDA cleared or approved.  A negative test does not rule out the presence of PCR inhibitors in the specimen or target RNA in concentration below the limit of detection for the assay. The possibility of a false negative should be considered if the patient's recent exposure or clinical presentation suggests COVID-19.  [facility] are certified under the Clinical Laboratory Improvement Amendments of 1988 (CLIA-88) as qualified to perform moderate and/or high complexity laboratory testing.   CT Chest Pulmonary Embolism w Contrast    Narrative    CT CHEST PULMONARY EMBOLISM WITH CONTRAST 9/16/2021 4:18 PM    CLINICAL HISTORY: COVID positive, shortness of breath, elevated  D-dimer.    TECHNIQUE: CT angiogram chest during arterial phase injection IV  contrast. 2D and 3D MIP reconstructions were performed by the CT  technologist. Dose reduction techniques were used.     CONTRAST: 90 mL Isovue 370    COMPARISON: None.    FINDINGS:  ANGIOGRAM CHEST: Pulmonary arteries are normal caliber and negative  for pulmonary emboli. Thoracic aorta is negative for dissection. No  CT  evidence of right heart strain.    LUNGS AND PLEURA: There are mixed groundglass and airspace infiltrates  throughout both lungs, consistent with COVID-19 pneumonia. No pleural  effusion.    MEDIASTINUM/AXILLAE: No lymphadenopathy. No coronary artery  calcification.    UPPER ABDOMEN: Normal.    MUSCULOSKELETAL: Normal.      Impression    IMPRESSION:  1.  Findings consistent with COVID-19 pneumonia.  2.  No evidence of pulmonary embolus.    PAULA KU MD         SYSTEM ID:  [id number]       Medications   iopamidol (ISOVUE-370) solution 500 mL (75 mLs Intravenous Given 9/16/21 1559)   Sodium Chloride 0.9 % bag 100mL for CT scan (70 mLs Intravenous Given 9/16/21 1558)   dexamethasone PF (DECADRON) injection 6 mg (6 mg Intravenous Given 9/16/21 1702)   ondansetron (ZOFRAN-ODT) ODT tab 4 mg (4 mg Oral Given 9/16/21 2037)       Assessments & Plan     I have reviewed the findings, diagnosis, plan with the patient.    There are no discharge medications for this patient.      Final diagnoses:   Pneumonia due to 2019 novel coronavirus   Hypoxemia     Disposition: Patient transferred to [facility] via EMS in stable condition.    Note: Chart documentation done in part with Dragon Voice Recognition software. Although reviewed after completion, some word and grammatical errors may remain.     9/16/2021   [facility] EMERGENCY DEPT     Andie Bee MD  09/16/21 9850

## 2021-09-17 NOTE — PROGRESS NOTES
DX: Covid Positive dx 9/7   Tele: na  A&O x4  Activity: SBA   Diet: Reg  VSS: Q4  O2: HFNC 80% 40L 96%  BG: na  PIV: SL RA  Pain: denies  GI/: continent up to bedside commode  Labs: K 3.8  Plan: Remdesivir, Decadron, Lovenox  Discharge: TBD continue plan of care.  Pt to prone as much as she can.  Infrequent non-productive cough, spoke with family today they want updates.  Mag and Phos to be checked in the am.  KEISHA Blas (daughter) wants to be called with updates

## 2021-09-17 NOTE — PROGRESS NOTES
1230 pg Respiratory Jasmine wants patient on high flow     1445 pg respiratory again to have patient put on high flow

## 2021-09-17 NOTE — ED NOTES
Cough improved. Pt tolerating fluids. Pt given apple sauce. Pt waiting for transport. Pt updated. Vss stable . Pt on 3 liters 02.

## 2021-09-18 LAB
ANION GAP SERPL CALCULATED.3IONS-SCNC: 6 MMOL/L (ref 3–14)
BUN SERPL-MCNC: 21 MG/DL (ref 7–30)
CALCIUM SERPL-MCNC: 8.3 MG/DL (ref 8.5–10.1)
CHLORIDE BLD-SCNC: 107 MMOL/L (ref 94–109)
CO2 SERPL-SCNC: 26 MMOL/L (ref 20–32)
CREAT SERPL-MCNC: 0.65 MG/DL (ref 0.52–1.04)
CRP SERPL-MCNC: 44.3 MG/L (ref 0–8)
ERYTHROCYTE [DISTWIDTH] IN BLOOD BY AUTOMATED COUNT: 12.9 % (ref 10–15)
GFR SERPL CREATININE-BSD FRML MDRD: >90 ML/MIN/1.73M2
GLUCOSE BLD-MCNC: 131 MG/DL (ref 70–99)
HCT VFR BLD AUTO: 42.1 % (ref 35–47)
HGB BLD-MCNC: 13.7 G/DL (ref 11.7–15.7)
MAGNESIUM SERPL-MCNC: 2.8 MG/DL (ref 1.6–2.3)
MCH RBC QN AUTO: 29.5 PG (ref 26.5–33)
MCHC RBC AUTO-ENTMCNC: 32.5 G/DL (ref 31.5–36.5)
MCV RBC AUTO: 91 FL (ref 78–100)
PHOSPHATE SERPL-MCNC: 3.5 MG/DL (ref 2.5–4.5)
PLATELET # BLD AUTO: 297 10E3/UL (ref 150–450)
POTASSIUM BLD-SCNC: 3.6 MMOL/L (ref 3.4–5.3)
RBC # BLD AUTO: 4.65 10E6/UL (ref 3.8–5.2)
SODIUM SERPL-SCNC: 139 MMOL/L (ref 133–144)
WBC # BLD AUTO: 10 10E3/UL (ref 4–11)

## 2021-09-18 PROCEDURE — 250N000009 HC RX 250: Performed by: INTERNAL MEDICINE

## 2021-09-18 PROCEDURE — 999N000215 HC STATISTIC HFNC ADULT NON-CPAP

## 2021-09-18 PROCEDURE — 250N000011 HC RX IP 250 OP 636: Performed by: INTERNAL MEDICINE

## 2021-09-18 PROCEDURE — 86140 C-REACTIVE PROTEIN: CPT | Performed by: INTERNAL MEDICINE

## 2021-09-18 PROCEDURE — 85027 COMPLETE CBC AUTOMATED: CPT | Performed by: INTERNAL MEDICINE

## 2021-09-18 PROCEDURE — 99233 SBSQ HOSP IP/OBS HIGH 50: CPT | Performed by: INTERNAL MEDICINE

## 2021-09-18 PROCEDURE — 999N000157 HC STATISTIC RCP TIME EA 10 MIN

## 2021-09-18 PROCEDURE — 120N000001 HC R&B MED SURG/OB

## 2021-09-18 PROCEDURE — 94640 AIRWAY INHALATION TREATMENT: CPT | Mod: 76

## 2021-09-18 PROCEDURE — 83735 ASSAY OF MAGNESIUM: CPT | Performed by: INTERNAL MEDICINE

## 2021-09-18 PROCEDURE — 84100 ASSAY OF PHOSPHORUS: CPT | Performed by: INTERNAL MEDICINE

## 2021-09-18 PROCEDURE — 94640 AIRWAY INHALATION TREATMENT: CPT

## 2021-09-18 PROCEDURE — 36415 COLL VENOUS BLD VENIPUNCTURE: CPT | Performed by: INTERNAL MEDICINE

## 2021-09-18 PROCEDURE — 250N000013 HC RX MED GY IP 250 OP 250 PS 637: Performed by: INTERNAL MEDICINE

## 2021-09-18 PROCEDURE — 258N000003 HC RX IP 258 OP 636: Performed by: INTERNAL MEDICINE

## 2021-09-18 PROCEDURE — 250N000012 HC RX MED GY IP 250 OP 636 PS 637: Performed by: INTERNAL MEDICINE

## 2021-09-18 PROCEDURE — 80048 BASIC METABOLIC PNL TOTAL CA: CPT | Performed by: INTERNAL MEDICINE

## 2021-09-18 RX ADMIN — SODIUM CHLORIDE 50 ML: 9 INJECTION, SOLUTION INTRAVENOUS at 17:34

## 2021-09-18 RX ADMIN — PANTOPRAZOLE SODIUM 40 MG: 40 TABLET, DELAYED RELEASE ORAL at 08:54

## 2021-09-18 RX ADMIN — ENOXAPARIN SODIUM 40 MG: 40 INJECTION SUBCUTANEOUS at 08:54

## 2021-09-18 RX ADMIN — ALBUTEROL SULFATE 2 PUFF: 90 AEROSOL, METERED RESPIRATORY (INHALATION) at 20:03

## 2021-09-18 RX ADMIN — REMDESIVIR 100 MG: 100 INJECTION, POWDER, LYOPHILIZED, FOR SOLUTION INTRAVENOUS at 17:33

## 2021-09-18 RX ADMIN — ALBUTEROL SULFATE 2 PUFF: 90 AEROSOL, METERED RESPIRATORY (INHALATION) at 15:46

## 2021-09-18 RX ADMIN — DEXAMETHASONE 6 MG: 2 TABLET ORAL at 08:54

## 2021-09-18 RX ADMIN — ALBUTEROL SULFATE 2 PUFF: 90 AEROSOL, METERED RESPIRATORY (INHALATION) at 07:30

## 2021-09-18 ASSESSMENT — ACTIVITIES OF DAILY LIVING (ADL)
ADLS_ACUITY_SCORE: 5
ADLS_ACUITY_SCORE: 7

## 2021-09-18 NOTE — PROGRESS NOTES
Pt remains on HFNC for increased O2 needs and SOB.    Pt is on HFNC on 40 L 70% FIO2.    Pt's BS are diminished with sat's in the low to mid 90's.    Will continue to follow and assess.    Emy Lowe, RT on 9/18/2021 at 5:03 PM

## 2021-09-18 NOTE — PROGRESS NOTES
[facility]  Hospitalist Progress Note    Assessment & Plan   Sabina Hernandeznikole Rm is a 66 year old healthy female who was admitted on 9/16/2021 with SOB and diagnosed with acute hypoxic respiratory failure associated with COVID-19 pneumonia. Patient is unvaccinated.     Patient was initially seen in [facility] ED and was transferred to [facility] for ongoing cares. Patient had increased oxygen needs 9/17/21 and was transitioned to HFNC. Tolerating increased oxygen support. However, refusing activity and prone position.     Acute hypoxic respiratory failure associated with acute COVID-19 pneumonitis: CRP 95. D-dimer 2.12. CTA negative for PE. Procal negative.   -COVID-19 positive 9/16/21 (reported positive 9/7/21); exposure 9/3/21  -Remdesivir started 9/16 - present; 5 day course  -Dexamethasone 9/16 - present; 10 day course   -GI prophylaxis: PPI  -Anticoagulation: Lovenox  -Oxygen: Worsening oxygen needs. Transition to HFNC. Prone as tolerated. Encourage pulmonary hygiene with IC, albuterol inhaler  -Supportive cares    FEN: Oral hydration; monitor; regular   Activity: As tolerated  DVT Prophylaxis: Enoxaparin (Lovenox) SQ  Family update: Offered to update family - patient declined     Code Status: Full Code    Disposition: 2-3 days pending improvement in respiratory status.     Text Page (7am - 6pm, M-F)    Interval History   Patient has stable oxygen needs. No worsening symptoms. Tolerating diet. Refusing prone position. Only question is how much longer does she need to be in the hospital. Discussed with nursing.     -Data reviewed today: I reviewed all new labs and imaging results over the last 24 hours. I personally reviewed:     Physical Exam   Temp: 97.8  F (36.6  C) Temp src: Oral BP: 126/64 Pulse: 63   Resp: 20 SpO2: 95 % O2 Device: High Flow Nasal Cannula (HFNC) Oxygen Delivery: 40 LPM  Vitals:    09/16/21 2205   Weight: 91.7 kg (202 lb 1.6 oz)     Vital Signs with Ranges  Temp:  [97.8  F  (36.6  C)-98.2  F (36.8  C)] 98.1  F (36.7  C)  Pulse:  [62-82] 68  Resp:  [18-22] 20  BP: (113-133)/(51-70) 113/68  FiO2 (%):  [75 %-80 %] 75 %  SpO2:  [91 %-98 %] 95 %  I/O last 3 completed shifts:  In: 240 [P.O.:240]  Out: -     Constitutional: Awake, alert, cooperative, no apparent distress. Ill in appearance. Non-toxic. Appears stated age.   HEENT: Atraumatic. Normocephalic. Conjunctiva non-injected. Sclera anicteric. MMM.   Respiratory: Moves air bilaterally but diminished throughout. No wheezing or rales.   Cardiovascular: Regular rate and rhythm, normal S1 and S2, and no murmur noted  GI: Normal bowel sounds, soft, non-distended, non-tender  Skin/Integumen: No rashes, no cyanosis, no edema    Medications     - MEDICATION INSTRUCTIONS -         remdesivir  100 mg Intravenous Q24H    And     sodium chloride 0.9%  50 mL Intravenous Q24H     albuterol  2 puff Inhalation 4x Daily     dexamethasone  6 mg Oral Daily     enoxaparin ANTICOAGULANT  40 mg Subcutaneous Q24H     pantoprazole  40 mg Oral QAM AC     sodium chloride (PF)  3 mL Intracatheter Q8H     Data   Recent Labs   Lab 09/18/21  0657 09/17/21  0746 09/16/21  1405   WBC 10.0 5.9 8.5   HGB 13.7 14.0 14.5   MCV 91 92 88    230 206    138 133   POTASSIUM 3.6 3.8 3.8   CHLORIDE 107 103 99   CO2 26 29 24   BUN 21 14 13   CR 0.65 0.67 0.77   ANIONGAP 6 6 10   TANYA 8.3* 8.6 8.0*   * 133* 101*   ALBUMIN  --   --  3.2*   PROTTOTAL  --   --  7.5   BILITOTAL  --   --  0.9   ALKPHOS  --   --  39*   ALT  --   --  41   AST  --   --  40   TROPONIN  --   --  <0.015     No results found for this or any previous visit (from the past 24 hour(s)).

## 2021-09-18 NOTE — PROGRESS NOTES
RT-Pt remains on HFNC  80%, 40LPM for PEEP/oxygen support. RT will continue to assess and follow.     Kee Dobson, RT on 9/18/2021 at 4:42 AM

## 2021-09-18 NOTE — PLAN OF CARE
To Do:  End of Shift Summary  For vital signs and complete assessments, please see documentation flowsheets.     Pertinent assessments: VSS. A&O x4. Denies pain. Denies SOB. On HFNC 40 lpm 80%. LS crackles, infrequent non productive cough. Bladder scanned pt this AM and pt retaining 117. Placed hat in bathroom to measure I&O's. Will continue to monitor.    Major Shift Events: Bladder scan 117 @ 0600    Treatment Plan: Remdesivir, decadron, enoxaparin supplemental oxygen. GI/    Bedside Nurse: Reyna Ha RN

## 2021-09-18 NOTE — PLAN OF CARE
Assumed care from 7p-11p: Pt A&Ox4, VSS, denies pain.  COVID +, getting decadrom/remdesivir and Lovenox. HFNC set at 80% 40L, independent in room.  Regular diet. Q4 VS. Discharge plan 2-3 days. Continue plan of care

## 2021-09-19 LAB
ANION GAP SERPL CALCULATED.3IONS-SCNC: 9 MMOL/L (ref 3–14)
BUN SERPL-MCNC: 18 MG/DL (ref 7–30)
CALCIUM SERPL-MCNC: 7.9 MG/DL (ref 8.5–10.1)
CHLORIDE BLD-SCNC: 108 MMOL/L (ref 94–109)
CO2 SERPL-SCNC: 26 MMOL/L (ref 20–32)
CREAT SERPL-MCNC: 0.66 MG/DL (ref 0.52–1.04)
CRP SERPL-MCNC: 20.2 MG/L (ref 0–8)
ERYTHROCYTE [DISTWIDTH] IN BLOOD BY AUTOMATED COUNT: 12.9 % (ref 10–15)
GFR SERPL CREATININE-BSD FRML MDRD: >90 ML/MIN/1.73M2
GLUCOSE BLD-MCNC: 133 MG/DL (ref 70–99)
HCT VFR BLD AUTO: 39.7 % (ref 35–47)
HGB BLD-MCNC: 12.8 G/DL (ref 11.7–15.7)
MCH RBC QN AUTO: 29.6 PG (ref 26.5–33)
MCHC RBC AUTO-ENTMCNC: 32.2 G/DL (ref 31.5–36.5)
MCV RBC AUTO: 92 FL (ref 78–100)
PLATELET # BLD AUTO: 298 10E3/UL (ref 150–450)
POTASSIUM BLD-SCNC: 3.5 MMOL/L (ref 3.4–5.3)
RBC # BLD AUTO: 4.33 10E6/UL (ref 3.8–5.2)
SODIUM SERPL-SCNC: 143 MMOL/L (ref 133–144)
WBC # BLD AUTO: 9.7 10E3/UL (ref 4–11)

## 2021-09-19 PROCEDURE — 250N000012 HC RX MED GY IP 250 OP 636 PS 637: Performed by: INTERNAL MEDICINE

## 2021-09-19 PROCEDURE — 94640 AIRWAY INHALATION TREATMENT: CPT | Mod: 76

## 2021-09-19 PROCEDURE — 99233 SBSQ HOSP IP/OBS HIGH 50: CPT | Performed by: HOSPITALIST

## 2021-09-19 PROCEDURE — 999N000215 HC STATISTIC HFNC ADULT NON-CPAP

## 2021-09-19 PROCEDURE — 80048 BASIC METABOLIC PNL TOTAL CA: CPT | Performed by: INTERNAL MEDICINE

## 2021-09-19 PROCEDURE — 250N000011 HC RX IP 250 OP 636: Performed by: INTERNAL MEDICINE

## 2021-09-19 PROCEDURE — 85014 HEMATOCRIT: CPT | Performed by: INTERNAL MEDICINE

## 2021-09-19 PROCEDURE — 258N000003 HC RX IP 258 OP 636: Performed by: INTERNAL MEDICINE

## 2021-09-19 PROCEDURE — 86140 C-REACTIVE PROTEIN: CPT | Performed by: INTERNAL MEDICINE

## 2021-09-19 PROCEDURE — 250N000013 HC RX MED GY IP 250 OP 250 PS 637: Performed by: INTERNAL MEDICINE

## 2021-09-19 PROCEDURE — 36415 COLL VENOUS BLD VENIPUNCTURE: CPT | Performed by: INTERNAL MEDICINE

## 2021-09-19 PROCEDURE — 999N000157 HC STATISTIC RCP TIME EA 10 MIN

## 2021-09-19 PROCEDURE — 250N000009 HC RX 250: Performed by: INTERNAL MEDICINE

## 2021-09-19 PROCEDURE — 94640 AIRWAY INHALATION TREATMENT: CPT

## 2021-09-19 PROCEDURE — 120N000001 HC R&B MED SURG/OB

## 2021-09-19 RX ADMIN — REMDESIVIR 100 MG: 100 INJECTION, POWDER, LYOPHILIZED, FOR SOLUTION INTRAVENOUS at 16:32

## 2021-09-19 RX ADMIN — ALBUTEROL SULFATE 2 PUFF: 90 AEROSOL, METERED RESPIRATORY (INHALATION) at 07:32

## 2021-09-19 RX ADMIN — ALBUTEROL SULFATE 2 PUFF: 90 AEROSOL, METERED RESPIRATORY (INHALATION) at 11:46

## 2021-09-19 RX ADMIN — ALBUTEROL SULFATE 2 PUFF: 90 AEROSOL, METERED RESPIRATORY (INHALATION) at 20:39

## 2021-09-19 RX ADMIN — PANTOPRAZOLE SODIUM 40 MG: 40 TABLET, DELAYED RELEASE ORAL at 06:56

## 2021-09-19 RX ADMIN — ENOXAPARIN SODIUM 40 MG: 40 INJECTION SUBCUTANEOUS at 10:49

## 2021-09-19 RX ADMIN — SODIUM CHLORIDE 50 ML: 9 INJECTION, SOLUTION INTRAVENOUS at 16:33

## 2021-09-19 RX ADMIN — DEXAMETHASONE 6 MG: 2 TABLET ORAL at 10:49

## 2021-09-19 ASSESSMENT — ACTIVITIES OF DAILY LIVING (ADL)
ADLS_ACUITY_SCORE: 7

## 2021-09-19 NOTE — PROGRESS NOTES
"[facility]    Hospitalist Progress Note             Date of Admission:  9/16/2021                   Day of hospitalization: 3    Assessment and Plan:     Sabina Rm is a 66 year old healthy female who was admitted on 9/16/2021 with SOB and diagnosed with acute hypoxic respiratory failure associated with COVID-19 pneumonia. Patient is unvaccinated.      Patient was initially seen in [facility] ED and was transferred to [facility] for ongoing cares. Patient had increased oxygen needs 9/17/21 and was transitioned to HFNC. Tolerating increased oxygen support. However, refusing activity and prone position.      Acute hypoxic respiratory failure associated with acute COVID-19 pneumonitis: CRP 95. D-dimer 2.12. CTA negative for PE. Procal negative.   -COVID-19 positive 9/16/21 (reported positive 9/7/21); exposure 9/3/21  -Remdesivir started 9/16 - present; 5 day course  -Dexamethasone 9/16 - present; 10 day course   -GI prophylaxis: PPI  -Anticoagulation: Lovenox  -Oxygen: Worsening oxygen needs. Transition to HFNC. Prone as tolerated. Encourage pulmonary hygiene with IC, albuterol inhaler  -Supportive cares     FEN: Oral hydration; monitor; regular   Activity: As tolerated  DVT Prophylaxis: Enoxaparin (Lovenox)   Code Status: Full Code     Disposition: 2-3 days pending improvement in respiratory status.                     Arlene Ascencio MD  Text Page (7am - 6pm, M-F)               Subjective   Chief Complaint:SOB  Subjective: Feels well minimal complaints has not been mobile while in the hospital       Objective   /66 (BP Location: Left arm)   Pulse 72   Temp 98  F (36.7  C) (Oral)   Resp 20   Ht 1.676 m (5' 6\")   Wt 91.7 kg (202 lb 1.6 oz)   SpO2 96%   BMI 32.62 kg/m       Physical Exam  General: Pt in NAD, normal appearance  HEENT: OP clear MMM, no JVD  Lungs: Basilar crackles, normal breathing  without accessory muscle usage, no wheezing, rhonchi or crackles  Cardiac: +S1, S2, " RRR, no MRG, no edema  Abdominal: normal bowel sounds, NT/ND, no hepatosplenomegaly  Skin: warm, dry, normal turgor, no rash  Psyche: A& O x3, appropriate affect             Intake/Output Summary (Last 24 hours) at 9/19/2021 1226  Last data filed at 9/19/2021 1151  Gross per 24 hour   Intake 300 ml   Output 600 ml   Net -300 ml           Labs and Imaging Results:      Recent Labs   Lab 09/19/21  0641 09/18/21  0657   WBC 9.7 10.0   HGB 12.8 13.7    297        Recent Labs   Lab 09/19/21  0641 09/18/21  0657    139   CO2 26 26   BUN 18 21      No results for input(s): INR, PTT in the last 168 hours.   No results for input(s): CKMB in the last 168 hours.    Invalid input(s): TROPONINT     Recent Labs   Lab 09/16/21  1405   ALBUMIN 3.2*   AST 40   ALT 41   ALKPHOS 39*        Micro:     Radio:  No orders to display           Medications:      Scheduled Meds:      remdesivir  100 mg Intravenous Q24H    And     sodium chloride 0.9%  50 mL Intravenous Q24H     albuterol  2 puff Inhalation 4x Daily     dexamethasone  6 mg Oral Daily     enoxaparin ANTICOAGULANT  40 mg Subcutaneous Q24H     pantoprazole  40 mg Oral QAM AC     sodium chloride (PF)  3 mL Intracatheter Q8H     Continuous Infusions:      - MEDICATION INSTRUCTIONS -       PRN Meds:  albuterol, lidocaine 4%, lidocaine (buffered or not buffered), - MEDICATION INSTRUCTIONS -, sodium chloride (PF)

## 2021-09-19 NOTE — PLAN OF CARE
To Do:  End of Shift Summary  For vital signs and complete assessments, please see documentation flowsheets.     Pertinent assessments: Patient is AoX4 this shift. Denies having any pain. Complains of SOB/VALDES. Clear but diminished lung sounds. Active bowel sounds. Poor PO intake.     Major Shift Events: Weaned down to 45% and 35 LPM    Treatment Plan: Continue to wean oxygen as patient can tolerate. Remdesivir, lovenox and decadron     Bedside Nurse: Clover Engle RN

## 2021-09-19 NOTE — PROGRESS NOTES
RT-Pt remains on HFNC 40LPM, 70% for PEEP/oxygen support. RT will continue to assess and monitor.    Kee Dobson, RT on 9/19/2021 at 4:49 AM

## 2021-09-19 NOTE — PLAN OF CARE
To Do:  End of Shift Summary  For vital signs and complete assessments, please see documentation flowsheets.     Pertinent assessments: Patient is AOX4 this shift. Denies pain. Complains of SOB, on HFNC to maintain O2. Diminished lung sounds in all fields. Active bowel sounds but c/o constipation. Frequent cough noted.     Major Shift Events: HFNC able to be weaned slightly. Patient requires a lot of encouragement to get out of bed and still refuses at times. Retaining urine related to refusing OOB activity and bedside commode use. Education provided. Last void 600 mls with PVR of 160.     Treatment Plan: Wean oxygen as patient can tolerate. Encourage OOB activity, IS use, and coughing/deep breaths.  Remdesivir, decadron and Lovenox     Bedside Nurse: Clover Engle RN

## 2021-09-19 NOTE — PROGRESS NOTES
A&Ox4. HFNC weaned by RT - 40L, 70%. LS dim bilaterally. Bowel sounds hypoactive. Patient reluctant to get OOB, offered to get patient up to bedside commode x2 overnight, patient declined. Noted poor intake and low output. Bladder scan: 86.    Encourage intake and OOB activity. Wean O2 as tolerated. Pulmonary hygiene. Remdesivir, decadron, lovenox, inhalers.

## 2021-09-19 NOTE — PROGRESS NOTES
"[id number]    Denies any pain or discomfort. Still on HFNC 80L/45%, tolerating well.   diminished lung sounds, alert and oriented x4.  declined to get OOB.  Reports she voids once every 8 hours. Last bladder scan 100cc. Denies abdominal pain or discomfort.  /69 (BP Location: Left arm)   Pulse 63   Temp 98  F (36.7  C) (Oral)   Resp 20   Ht 1.676 m (5' 6\")   Wt 91.7 kg (202 lb 1.6 oz)   SpO2 95%   BMI 32.62 kg/m      "

## 2021-09-20 LAB
ANION GAP SERPL CALCULATED.3IONS-SCNC: 6 MMOL/L (ref 3–14)
BUN SERPL-MCNC: 13 MG/DL (ref 7–30)
CALCIUM SERPL-MCNC: 7.8 MG/DL (ref 8.5–10.1)
CHLORIDE BLD-SCNC: 108 MMOL/L (ref 94–109)
CO2 SERPL-SCNC: 26 MMOL/L (ref 20–32)
CREAT SERPL-MCNC: 0.62 MG/DL (ref 0.52–1.04)
CRP SERPL-MCNC: 11.1 MG/L (ref 0–8)
ERYTHROCYTE [DISTWIDTH] IN BLOOD BY AUTOMATED COUNT: 12.7 % (ref 10–15)
GFR SERPL CREATININE-BSD FRML MDRD: >90 ML/MIN/1.73M2
GLUCOSE BLD-MCNC: 105 MG/DL (ref 70–99)
HCT VFR BLD AUTO: 41.8 % (ref 35–47)
HGB BLD-MCNC: 13.5 G/DL (ref 11.7–15.7)
MCH RBC QN AUTO: 30.3 PG (ref 26.5–33)
MCHC RBC AUTO-ENTMCNC: 32.3 G/DL (ref 31.5–36.5)
MCV RBC AUTO: 94 FL (ref 78–100)
PLATELET # BLD AUTO: 266 10E3/UL (ref 150–450)
POTASSIUM BLD-SCNC: 3.7 MMOL/L (ref 3.4–5.3)
RBC # BLD AUTO: 4.46 10E6/UL (ref 3.8–5.2)
SODIUM SERPL-SCNC: 140 MMOL/L (ref 133–144)
WBC # BLD AUTO: 7.8 10E3/UL (ref 4–11)

## 2021-09-20 PROCEDURE — 85027 COMPLETE CBC AUTOMATED: CPT | Performed by: INTERNAL MEDICINE

## 2021-09-20 PROCEDURE — 250N000011 HC RX IP 250 OP 636: Performed by: INTERNAL MEDICINE

## 2021-09-20 PROCEDURE — 80048 BASIC METABOLIC PNL TOTAL CA: CPT | Performed by: INTERNAL MEDICINE

## 2021-09-20 PROCEDURE — 94640 AIRWAY INHALATION TREATMENT: CPT

## 2021-09-20 PROCEDURE — 999N000157 HC STATISTIC RCP TIME EA 10 MIN

## 2021-09-20 PROCEDURE — 999N000215 HC STATISTIC HFNC ADULT NON-CPAP

## 2021-09-20 PROCEDURE — 258N000003 HC RX IP 258 OP 636: Performed by: INTERNAL MEDICINE

## 2021-09-20 PROCEDURE — 250N000012 HC RX MED GY IP 250 OP 636 PS 637: Performed by: INTERNAL MEDICINE

## 2021-09-20 PROCEDURE — 250N000009 HC RX 250: Performed by: INTERNAL MEDICINE

## 2021-09-20 PROCEDURE — 250N000013 HC RX MED GY IP 250 OP 250 PS 637: Performed by: INTERNAL MEDICINE

## 2021-09-20 PROCEDURE — 86140 C-REACTIVE PROTEIN: CPT | Performed by: INTERNAL MEDICINE

## 2021-09-20 PROCEDURE — 94640 AIRWAY INHALATION TREATMENT: CPT | Mod: 76

## 2021-09-20 PROCEDURE — 99233 SBSQ HOSP IP/OBS HIGH 50: CPT | Performed by: HOSPITALIST

## 2021-09-20 PROCEDURE — 120N000001 HC R&B MED SURG/OB

## 2021-09-20 PROCEDURE — 36415 COLL VENOUS BLD VENIPUNCTURE: CPT | Performed by: INTERNAL MEDICINE

## 2021-09-20 RX ADMIN — ALBUTEROL SULFATE 2 PUFF: 90 AEROSOL, METERED RESPIRATORY (INHALATION) at 11:41

## 2021-09-20 RX ADMIN — PANTOPRAZOLE SODIUM 40 MG: 40 TABLET, DELAYED RELEASE ORAL at 08:58

## 2021-09-20 RX ADMIN — ALBUTEROL SULFATE 2 PUFF: 90 AEROSOL, METERED RESPIRATORY (INHALATION) at 08:08

## 2021-09-20 RX ADMIN — ENOXAPARIN SODIUM 40 MG: 40 INJECTION SUBCUTANEOUS at 08:58

## 2021-09-20 RX ADMIN — SODIUM CHLORIDE 50 ML: 9 INJECTION, SOLUTION INTRAVENOUS at 18:20

## 2021-09-20 RX ADMIN — REMDESIVIR 100 MG: 100 INJECTION, POWDER, LYOPHILIZED, FOR SOLUTION INTRAVENOUS at 17:51

## 2021-09-20 RX ADMIN — DEXAMETHASONE 6 MG: 2 TABLET ORAL at 08:58

## 2021-09-20 ASSESSMENT — ACTIVITIES OF DAILY LIVING (ADL)
ADLS_ACUITY_SCORE: 5
ADLS_ACUITY_SCORE: 5
ADLS_ACUITY_SCORE: 7
ADLS_ACUITY_SCORE: 5

## 2021-09-20 NOTE — PROGRESS NOTES
RT-Pt remains on HFNC 35LPM, 45% for PEEP/Oxygen support. Bs diminished. RT will continue to assess and follow.    Kee Dobson, RT on 9/20/2021 at 3:39 AM

## 2021-09-20 NOTE — PROGRESS NOTES
Pt is alert and oriented,continues on HF NC 35L/45%, lung sounds diminished bilaterally, Poor IS use up to 500. Denies pain, up with SBA to bed side commode- voiding adequate amounts. Tolerating regular diet, hypo bowel sounds. Pt continues on Remdesivir,Decadron and Lovenox.Will continue to wean O2 as tolerated and encourage OOB/ IS use.

## 2021-09-20 NOTE — PROGRESS NOTES
"[facility]    Hospitalist Progress Note             Date of Admission:  9/16/2021                   Day of hospitalization: 4    Assessment and Plan:     Sabina Rm is a 66 year old healthy female who was admitted on 9/16/2021 with SOB and diagnosed with acute hypoxic respiratory failure associated with COVID-19 pneumonia. Patient is unvaccinated.      Patient was initially seen in [facility] ED and was transferred to [facility] for ongoing cares. Patient had increased oxygen needs 9/17/21 and was transitioned to HFNC. Tolerating increased oxygen support. However, refusing activity and prone position.      Acute hypoxic respiratory failure associated with acute COVID-19 pneumonitis: CRP 95. D-dimer 2.12. CTA negative for PE. Procal negative.   -COVID-19 positive 9/16/21 (reported positive 9/7/21); exposure 9/3/21  -Remdesivir started 9/16 - present; 5 day course  -Dexamethasone 9/16 - present; 10 day course   -GI prophylaxis: PPI  -Anticoagulation: Lovenox  -Oxygen: Worsening oxygen needs. Transition to HFNC. Prone as tolerated. Encourage pulmonary hygiene with IC, albuterol inhaler  -Supportive cares     FEN: Oral hydration; monitor; regular   Activity: As tolerated  DVT Prophylaxis: Enoxaparin (Lovenox)   Code Status: Full Code     Disposition: 2-3 days pending improvement in respiratory status.                     Arlene Ascencio MD  Text Page (7am - 6pm, M-F)               Subjective   Chief Complaint:SOB  Subjective: Feels well minimal complaints has not been mobile while in the hospital       Objective   BP (!) 142/71 (BP Location: Left arm)   Pulse 90   Temp 98.5  F (36.9  C) (Oral)   Resp 20   Ht 1.676 m (5' 6\")   Wt 91.7 kg (202 lb 1.6 oz)   SpO2 93%   BMI 32.62 kg/m       Physical Exam  General: Pt in NAD, normal appearance  HEENT: OP clear MMM, no JVD  Lungs: Basilar crackles, normal breathing  without accessory muscle usage, no wheezing, rhonchi or crackles  Cardiac: +S1, " S2, RRR, no MRG, no edema  Abdominal: normal bowel sounds, NT/ND, no hepatosplenomegaly  Skin: warm, dry, normal turgor, no rash  Psyche: A& O x3, appropriate affect             Intake/Output Summary (Last 24 hours) at 9/19/2021 1226  Last data filed at 9/19/2021 1151  Gross per 24 hour   Intake 300 ml   Output 600 ml   Net -300 ml           Labs and Imaging Results:      Recent Labs   Lab 09/20/21  0629 09/19/21  0641   WBC 7.8 9.7   HGB 13.5 12.8    298        Recent Labs   Lab 09/20/21  0629 09/19/21  0641    143   CO2 26 26   BUN 13 18      No results for input(s): INR, PTT in the last 168 hours.   No results for input(s): CKMB in the last 168 hours.    Invalid input(s): TROPONINT     Recent Labs   Lab 09/16/21  1405   ALBUMIN 3.2*   AST 40   ALT 41   ALKPHOS 39*        Micro:     Radio:  No orders to display           Medications:      Scheduled Meds:      remdesivir  100 mg Intravenous Q24H    And     sodium chloride 0.9%  50 mL Intravenous Q24H     albuterol  2 puff Inhalation 4x Daily     dexamethasone  6 mg Oral Daily     enoxaparin ANTICOAGULANT  40 mg Subcutaneous Q24H     pantoprazole  40 mg Oral QAM AC     sodium chloride (PF)  3 mL Intracatheter Q8H     Continuous Infusions:      - MEDICATION INSTRUCTIONS -       PRN Meds:  albuterol, lidocaine 4%, lidocaine (buffered or not buffered), - MEDICATION INSTRUCTIONS -, sodium chloride (PF)

## 2021-09-20 NOTE — PLAN OF CARE
Vitals: Temp: 98.5  F (36.9  C) Temp src: Oral BP: (!) 142/71 Pulse: 90   Resp: 20 SpO2: 93 % O2 Device: Oxymask Oxygen Delivery: 7 LPM  Pain: Denies  Neuro: AO4, able to make needs known  Respiratory: LS dim, barajas. IS use encouraged  Cardiac/tele: WDL, denies CP  GI/: WDL  Skin: WDL  LDAs: PIV SL  Labs: CRP trending down  Diet: Regular  Activity: SBA  Plan: Continue to wean O2 as able. Will continue POC.

## 2021-09-21 PROCEDURE — 94640 AIRWAY INHALATION TREATMENT: CPT

## 2021-09-21 PROCEDURE — 250N000013 HC RX MED GY IP 250 OP 250 PS 637: Performed by: INTERNAL MEDICINE

## 2021-09-21 PROCEDURE — 99232 SBSQ HOSP IP/OBS MODERATE 35: CPT | Performed by: HOSPITALIST

## 2021-09-21 PROCEDURE — 94640 AIRWAY INHALATION TREATMENT: CPT | Mod: 76

## 2021-09-21 PROCEDURE — 120N000001 HC R&B MED SURG/OB

## 2021-09-21 PROCEDURE — 250N000011 HC RX IP 250 OP 636: Performed by: INTERNAL MEDICINE

## 2021-09-21 PROCEDURE — 250N000012 HC RX MED GY IP 250 OP 636 PS 637: Performed by: INTERNAL MEDICINE

## 2021-09-21 PROCEDURE — 999N000157 HC STATISTIC RCP TIME EA 10 MIN

## 2021-09-21 RX ADMIN — ALBUTEROL SULFATE 2 PUFF: 90 AEROSOL, METERED RESPIRATORY (INHALATION) at 16:26

## 2021-09-21 RX ADMIN — ENOXAPARIN SODIUM 40 MG: 40 INJECTION SUBCUTANEOUS at 08:21

## 2021-09-21 RX ADMIN — PANTOPRAZOLE SODIUM 40 MG: 40 TABLET, DELAYED RELEASE ORAL at 08:21

## 2021-09-21 RX ADMIN — ALBUTEROL SULFATE 2 PUFF: 90 AEROSOL, METERED RESPIRATORY (INHALATION) at 08:49

## 2021-09-21 RX ADMIN — DEXAMETHASONE 6 MG: 2 TABLET ORAL at 08:21

## 2021-09-21 RX ADMIN — ALBUTEROL SULFATE 2 PUFF: 90 AEROSOL, METERED RESPIRATORY (INHALATION) at 12:31

## 2021-09-21 RX ADMIN — ALBUTEROL SULFATE 2 PUFF: 90 AEROSOL, METERED RESPIRATORY (INHALATION) at 20:43

## 2021-09-21 ASSESSMENT — ACTIVITIES OF DAILY LIVING (ADL)
ADLS_ACUITY_SCORE: 5

## 2021-09-21 NOTE — PLAN OF CARE
Pertinent assessments: VSS on 7L Oxymask. Afebrile. Up independently. Some VALDES. LS are diminished. Nonproductive cough. IS self administered. Tolerating a regular diet. A&Ox4.    Major Shift Events: new piv placed.    Treatment Plan: O2, Remdesivir, Decadron, Lovenox.

## 2021-09-21 NOTE — PROGRESS NOTES
"[facility]    Hospitalist Progress Note             Date of Admission:  9/16/2021                   Day of hospitalization: 5    Assessment and Plan:     Sabina Rm is a 66 year old healthy female who was admitted on 9/16/2021 with SOB and diagnosed with acute hypoxic respiratory failure associated with COVID-19 pneumonia. Patient is unvaccinated.      Patient was initially seen in [facility] ED and was transferred to [facility] for ongoing cares. Patient had increased oxygen needs 9/17/21 and was transitioned to HFNC. Tolerating increased oxygen support. However, refusing activity and prone position.      Acute hypoxic respiratory failure associated with acute COVID-19 pneumonitis: CRP 95. D-dimer 2.12. CTA negative for PE. Procal negative.   -COVID-19 positive 9/16/21 (reported positive 9/7/21); exposure 9/3/21  -Remdesivir started 9/16 - finished 5 day course  -Dexamethasone 9/16 - present; 10 day course   -GI prophylaxis: PPI  -Anticoagulation: Lovenox  -Oxygen: improved on nasal canula now   Encourage pulmonary hygiene with IC, albuterol inhaler  -Supportive cares     FEN: Oral hydration; monitor; regular   Activity: As tolerated  DVT Prophylaxis: Enoxaparin (Lovenox)   Code Status: Full Code     Disposition: 1-2 days pending improvement in respiratory status.                     Arlene Ascencio MD  Text Page (7am - 6pm, M-F)               Subjective   Chief Complaint:SOB  Subjective: Feels well minimal complaints has not been mobile while in the hospital, but is feeling ok walking to and from bathroom       Objective   /65 (BP Location: Left arm)   Pulse 67   Temp 98.3  F (36.8  C) (Oral)   Resp 16   Ht 1.676 m (5' 6\")   Wt 91.7 kg (202 lb 1.6 oz)   SpO2 95%   BMI 32.62 kg/m       Physical Exam  General: Pt in NAD, normal appearance  HEENT: OP clear MMM, no JVD  Lungs: Basilar crackles, normal breathing  without accessory muscle usage, no wheezing, rhonchi or " crackles  Cardiac: +S1, S2, RRR, no MRG, no edema  Abdominal: normal bowel sounds, NT/ND, no hepatosplenomegaly  Skin: warm, dry, normal turgor, no rash  Psyche: A& O x3, appropriate affect             Intake/Output Summary (Last 24 hours) at 9/19/2021 1226  Last data filed at 9/19/2021 1151  Gross per 24 hour   Intake 300 ml   Output 600 ml   Net -300 ml           Labs and Imaging Results:      Recent Labs   Lab 09/20/21  0629 09/19/21  0641   WBC 7.8 9.7   HGB 13.5 12.8    298        Recent Labs   Lab 09/20/21  0629 09/19/21  0641    143   CO2 26 26   BUN 13 18      No results for input(s): INR, PTT in the last 168 hours.   No results for input(s): CKMB in the last 168 hours.    Invalid input(s): TROPONINT     Recent Labs   Lab 09/16/21  1405   ALBUMIN 3.2*   AST 40   ALT 41   ALKPHOS 39*        Micro:     Radio:  No orders to display           Medications:      Scheduled Meds:      albuterol  2 puff Inhalation 4x Daily     dexamethasone  6 mg Oral Daily     enoxaparin ANTICOAGULANT  40 mg Subcutaneous Q24H     pantoprazole  40 mg Oral QAM AC     sodium chloride (PF)  3 mL Intracatheter Q8H     Continuous Infusions:      - MEDICATION INSTRUCTIONS -       PRN Meds:  albuterol, lidocaine 4%, lidocaine (buffered or not buffered), - MEDICATION INSTRUCTIONS -, sodium chloride (PF)

## 2021-09-21 NOTE — PROGRESS NOTES
SPIRITUAL HEALTH SERVICES: Tele-Encounter  Patient Location:  Med. Surg. 5  Spoke with: Patient    Referral Source: Contacted pt Sabina per her length of stay.    Called Sabina's bedside phone and introduced myself.  Sabina deferred  support at this time.    PLAN: Informed pt how she can request SHS.  This author and other chaplains remain available per pt/family request.    Chaplain Eyal Perla M.Div., [facility]  Staff   Phone [phone]    ______________________________    Type of service:  Telephone Visit     has received verbal consent for a TelephoneVisit from the patient? No    Distance Provider Location: designated [place] office or home office (secure setting)    Mode of Communication: telephone (via [employer] phone or [employer] tele-call-number ([phone]))

## 2021-09-21 NOTE — PLAN OF CARE
End of Shift Summary  For vital signs and complete assessments, please see documentation flowsheets.     Pertinent assessments: Pt up independently in room. Weaned to 3L O2, tolerating well. Lung sounds diminished, productive cough.     Major Shift Events: none    Treatment Plan: Wean O2, use IS, continue to monitor

## 2021-09-22 ENCOUNTER — DOCUMENTATION ONLY (OUTPATIENT)
Dept: MEDSURG UNIT | Facility: CLINIC | Age: 66
End: 2021-09-22

## 2021-09-22 VITALS
HEIGHT: 66 IN | TEMPERATURE: 98.6 F | SYSTOLIC BLOOD PRESSURE: 139 MMHG | WEIGHT: 202.1 LBS | RESPIRATION RATE: 18 BRPM | DIASTOLIC BLOOD PRESSURE: 73 MMHG | OXYGEN SATURATION: 95 % | BODY MASS INDEX: 32.48 KG/M2 | HEART RATE: 80 BPM

## 2021-09-22 PROCEDURE — 999N000157 HC STATISTIC RCP TIME EA 10 MIN

## 2021-09-22 PROCEDURE — 94640 AIRWAY INHALATION TREATMENT: CPT

## 2021-09-22 PROCEDURE — 99239 HOSP IP/OBS DSCHRG MGMT >30: CPT | Performed by: HOSPITALIST

## 2021-09-22 PROCEDURE — 250N000011 HC RX IP 250 OP 636: Performed by: INTERNAL MEDICINE

## 2021-09-22 PROCEDURE — 250N000013 HC RX MED GY IP 250 OP 250 PS 637: Performed by: INTERNAL MEDICINE

## 2021-09-22 PROCEDURE — 94640 AIRWAY INHALATION TREATMENT: CPT | Mod: 76

## 2021-09-22 PROCEDURE — 250N000012 HC RX MED GY IP 250 OP 636 PS 637: Performed by: INTERNAL MEDICINE

## 2021-09-22 RX ORDER — DEXAMETHASONE 6 MG/1
6 TABLET ORAL DAILY
Qty: 4 TABLET | Refills: 0 | Status: SHIPPED | OUTPATIENT
Start: 2021-09-23 | End: 2021-09-27

## 2021-09-22 RX ORDER — PANTOPRAZOLE SODIUM 40 MG/1
40 TABLET, DELAYED RELEASE ORAL DAILY
Qty: 30 TABLET | Refills: 0 | Status: SHIPPED | OUTPATIENT
Start: 2021-09-22 | End: 2021-10-22

## 2021-09-22 RX ADMIN — PANTOPRAZOLE SODIUM 40 MG: 40 TABLET, DELAYED RELEASE ORAL at 08:33

## 2021-09-22 RX ADMIN — ALBUTEROL SULFATE 2 PUFF: 90 AEROSOL, METERED RESPIRATORY (INHALATION) at 07:41

## 2021-09-22 RX ADMIN — DEXAMETHASONE 6 MG: 2 TABLET ORAL at 08:33

## 2021-09-22 RX ADMIN — ALBUTEROL SULFATE 2 PUFF: 90 AEROSOL, METERED RESPIRATORY (INHALATION) at 11:57

## 2021-09-22 RX ADMIN — ENOXAPARIN SODIUM 40 MG: 40 INJECTION SUBCUTANEOUS at 08:33

## 2021-09-22 ASSESSMENT — ACTIVITIES OF DAILY LIVING (ADL)
ADLS_ACUITY_SCORE: 5

## 2021-09-22 NOTE — PLAN OF CARE
To Do:  End of Shift Summary  For vital signs and complete assessments, please see documentation flowsheets.     Pertinent assessments: Bradycardic at times. O2 2L with NC. LS diminished. Infrequent non productive cough. Up ind  Major Shift Events: uneventful  Treatment Plan: Wean O2, IS, Decadron and Lovenox.   Bedside Nurse: Gale Mcclendon RN

## 2021-09-22 NOTE — CONSULTS
Pt needs new home 02.  I called [facility]. They will call us back with ETA.  Pt is Covid + so Bedside RN will need to educate Pt how to use.    ADDENDUM 11:54 02 will arrive within 2 hours.  They feel it will be sooner then that.      Taya Yen RN BSN   Inpatient Care Coordination  [facility]  [phone]

## 2021-09-22 NOTE — PROGRESS NOTES
Oxygen Documentation:   I certify that this patient, Sabina Rm has been under my care (or a nurse practitioner or physican's assistant working with me). This is the face-to-face encounter for oxygen medical necessity.      Sabina Rm is now in a chronic stable state and continues to require supplemental oxygen. Patient has continued oxygen desaturation due to covid .    Alternative treatment(s) tried or considered and deemed clinically infective for treatment of covid pneumonitis include inhalers, steroids, and pulmonary toileting.  If portability is ordered, is the patient mobile within the home? yes    **Patients who qualify for home O2 coverage under the CMS guidelines require ABG tests or O2 sat readings obtained closest to, but no earlier than 2 days prior to the discharge, as evidence of the need for home oxygen therapy. Testing must be performed while patient is in the chronic stable state. See notes for O2 sats.**

## 2021-09-22 NOTE — PLAN OF CARE
To Do:  End of Shift Summary  For vital signs and complete assessments, please see documentation flowsheets.     Pertinent assessments: Pt alert and oriented. Up ind. Lungs dim. Discharging this evening with 2L O2. Instructed patient on use of O2 tank and pulse oximeter. Pt verbalized understanding. VSS     Major Shift Events: Discussed discharge instructions, meds, and home O2. Pt denied questions and verbalized understanding. Meds given to patient.  Treatment Plan: discharge this evening  Bedside Nurse: MARIIA Gillis

## 2021-09-22 NOTE — PLAN OF CARE
Pt daughter came to pick her up 1700.  Was taken downstairs with all belongings in wheelchair.  Discharged with all paperwork and meds done on prior shift.

## 2021-09-22 NOTE — DISCHARGE SUMMARY
Discharge Summary  Hospitalist Service      Sabina Rm MRN# [id number]   YOB: 1955 Age: 66 year old     Date of Admission:  9/16/2021  Date of Discharge:  9/22/2021  Admitting Physician:  Amberly Silveira MD  Discharge Physician: Arlene Ascencio MD   Discharging Service: Hospitalist Service     Primary Provider: [facility]  Primary Care Physician Phone Number: [phone]         Discharge Diagnoses/Problem Oriented Hospital Course (Providers):      Discharge Diagnoses   Acute hypoxic respiratory failure associated with acute COVID-19 pneumonitis        Hospital Course   66 year old healthy female who was admitted on 9/16/2021 with SOB and diagnosed with acute hypoxic respiratory failure associated with COVID-19 pneumonia. Patient is unvaccinated.      Patient was initially seen in [facility] ED and was transferred to [facility] for ongoing cares. Patient had increased oxygen needs 9/17/21 and was transitioned to HFNC.  Patient symptoms are significantly improved now transitioned to nasal cannula.  Is requesting to go home on oxygen which is appropriate does have significantly improved from admission.     Acute hypoxic respiratory failure associated with acute COVID-19 pneumonitis: CRP 95. D-dimer 2.12. CTA negative for PE. Procal negative.   -COVID-19 positive 9/16/21 (reported positive 9/7/21); exposure 9/3/21  -Remdesivir started 9/16 finished 5-day course  -Dexamethasone 9/16 -need 4 more days  -GI prophylaxis: PPI  -Anticoagulation: Apixaban for discharge  -Oxygen: Discharged on home oxygen           Code Status:      Full Code         Important Results:                  Pending Results:        Unresulted Labs Ordered in the Past 30 Days of this Admission     No orders found from 8/17/2021 to 9/17/2021.               Discharge Instructions and Follow-Up:      Follow-up Appointments     Discharge - COVID Patient Oximeter Discharge Instructions      COVID Patient Oximeter  Discharge Instructions     Use the oximeter to measure the amount of oxygen in your blood.  Put the   clip on the tip of your pointer finger.  Turn on the device to measure   your oxygen level.  Do this at least 2 times a day. Do it more than 2   times if you feel short of breath.      It should be 90% or higher while you're at rest.  If your oxygen level is   89% or lower, change the position of the clip on your finger or try   another finger.  After 10 minutes if it's still 89% or lower, call 911/go   to the Emergency Department.       If your shortness of breath may be getting worse but the oximeter still   shows 90% or higher, call your doctor or clinic as soon as possible. If   you can't reach your doctor or clinic, or if your shortness of breath gets   very bad, call 911/go to the Emergency Department.    Note: Don't turn down your oxygen until you get instructions at your   virtual visit.         Follow-up and recommended labs and tests       Follow up with primary care provider, [facility], within 7   days for hospital follow- up.  The following labs/tests are recommended:   Consider repeating CT scan to follow resolution of lung findings.                  Discharge Disposition:        Discharged to home          Discharge Medications:        Current Discharge Medication List      START taking these medications    Details   apixaban ANTICOAGULANT (ELIQUIS) 2.5 MG tablet Take 1 tablet (2.5 mg) by mouth 2 times daily  Qty: 60 tablet, Refills: 0    Associated Diagnoses: COVID-19      dexamethasone (DECADRON) 6 MG tablet Take 1 tablet (6 mg) by mouth daily for 4 days  Qty: 4 tablet, Refills: 0    Associated Diagnoses: COVID-19      pantoprazole (PROTONIX) 40 MG EC tablet Take 1 tablet (40 mg) by mouth daily  Qty: 30 tablet, Refills: 0    Associated Diagnoses: COVID-19                  Allergies:         Allergies   Allergen Reactions     Aspirin Anaphylaxis            Consultations This Hospital Stay:  "       No consultations were requested during this admission          Condition and Physical Exam on Discharge:        Discharge condition: Stable   Discharge vitals: Blood pressure 139/73, pulse 67, temperature 98.6  F (37  C), temperature source Oral, resp. rate 18, height 1.676 m (5' 6\"), weight 91.7 kg (202 lb 1.6 oz), SpO2 95 %.   General: Pt in NAD, normal appearance  HEENT: OP clear MMM, no JVD  Lungs: Clear to Auscultation Bilateral, normal breathing  without accessory muscle usage, no wheezing, rhonchi or crackles  Cardiac: +S1, S2, RRR, no MRG, no edema  Abdominal: normal bowel sounds, NT/ND, no hepatosplenomegaly  Skin: warm, dry, normal turgor, no rash  Psyche: A& O x3, appropriate affect            Discharge Orders for Skilled Facility (from Discharge Orders):        After Care Instructions     Activity      Your activity upon discharge: activity as tolerated         Diet      Follow this diet upon discharge: Orders Placed This Encounter      Combination Diet Regular Diet Adult         Diet      Follow this diet upon discharge: Orders Placed This Encounter      Combination Diet Regular Diet Adult      Diet         Discharge - Quarantine/Isolation Instruction      Date of symptom onset:     Date of first positive test:    Stay home and away from others (self-isolate) for at least 20 days from when your symptoms started And...   You've had no fevers and no medicine that reduces fever for 1 full day (24 hours) And...   Your other symptoms have resolved (gotten better).                    Rehab orders for Skilled Facility (from Discharge Orders):      Referrals     Future Labs/Procedures    COVID-19 GetWell Loop Referral     Comments:    We know it can be scary to hear that you might have COVID-19. So our team   can help track your symptoms and make sure you are doing ok over the next   two weeks, we use a program called [employer] to keep in touch. When you   receive an email from [employer], please " consider enrolling in our   monitoring program. There is no cost to you for monitoring.     Here is a URL where you can learn more:  [email or web address]               Discharge Time:      Greater than 30 minutes.        Image Results From This Hospital Stay (For Non-EPIC Providers):        Results for orders placed or performed during the hospital encounter of 09/16/21   XR Chest Port 1 View    Narrative    CHEST PORTABLE ONE VIEW   9/16/2021 2:01 PM     HISTORY: Dyspnea/shortness of breath.    COMPARISON: None.      Impression    IMPRESSION: Portable chest. Patchy airspace opacities are noted  bilaterally concerning for viral pneumonitis in the correct clinical  setting. No pneumothorax or pleural effusions. Heart is normal in  size.    VASILE QURESHI MD         SYSTEM ID:  [id number]   CT Chest Pulmonary Embolism w Contrast    Narrative    CT CHEST PULMONARY EMBOLISM WITH CONTRAST 9/16/2021 4:18 PM    CLINICAL HISTORY: COVID positive, shortness of breath, elevated  D-dimer.    TECHNIQUE: CT angiogram chest during arterial phase injection IV  contrast. 2D and 3D MIP reconstructions were performed by the CT  technologist. Dose reduction techniques were used.     CONTRAST: 90 mL Isovue 370    COMPARISON: None.    FINDINGS:  ANGIOGRAM CHEST: Pulmonary arteries are normal caliber and negative  for pulmonary emboli. Thoracic aorta is negative for dissection. No CT  evidence of right heart strain.    LUNGS AND PLEURA: There are mixed groundglass and airspace infiltrates  throughout both lungs, consistent with COVID-19 pneumonia. No pleural  effusion.    MEDIASTINUM/AXILLAE: No lymphadenopathy. No coronary artery  calcification.    UPPER ABDOMEN: Normal.    MUSCULOSKELETAL: Normal.      Impression    IMPRESSION:  1.  Findings consistent with COVID-19 pneumonia.  2.  No evidence of pulmonary embolus.    PAULA KU MD         SYSTEM ID:  [id number]           Most Recent Lab Results In EPIC (For Non-EPIC  Providers):    Most Recent 3 CBC's:  Recent Labs   Lab Test 09/20/21  0629 09/19/21  0641 09/18/21  0657   WBC 7.8 9.7 10.0   HGB 13.5 12.8 13.7   MCV 94 92 91    298 297      Most Recent 3 BMP's:  Recent Labs   Lab Test 09/20/21  0629 09/19/21  0641 09/18/21  0657    143 139   POTASSIUM 3.7 3.5 3.6   CHLORIDE 108 108 107   CO2 26 26 26   BUN 13 18 21   CR 0.62 0.66 0.65   ANIONGAP 6 9 6   TANYA 7.8* 7.9* 8.3*   * 133* 131*     Most Recent 3 Troponin's:No lab results found.  Most Recent 3 INR's:No lab results found.  Most Recent 2 LFT's:  Recent Labs   Lab Test 09/16/21  1405   AST 40   ALT 41   ALKPHOS 39*   BILITOTAL 0.9     Most Recent Cholesterol Panel:No lab results found.  Most Recent 6 Bacteria Isolates From Any Culture (See EPIC Reports for Culture Details):No lab results found.  Most Recent TSH, T4 and HgbA1c:No lab results found.

## 2021-09-22 NOTE — DISCHARGE INSTRUCTIONS
Oxygen Provider:  Arranged through [employer], contact number [phone].  If you have any questions or concerns please call the oxygen company directly.

## 2021-09-22 NOTE — PROGRESS NOTES
Received Oxygen intake at 11:36am, reviewed chart; all documents present in the chart including a valid order.  11:53am- Spoke to Taya to let her now I had everything I needed and that we would deliver within 2 hours, but most likely much sooner.

## 2021-09-22 NOTE — PROGRESS NOTES
Patient has been assessed for Home Oxygen needs. Oxygen readings:    *Pulse oximetry (SpO2) = 89% on room air at rest while awake.    *SpO2 improved to 93% on 2 liters/minute at rest.    *SpO2 = 87% on room air during activity/with exercise.    *SpO2 improved to 88% on 2 liters/minute during activity/with exercise.

## 2021-09-22 NOTE — PLAN OF CARE
Pertinent assessments: VSS on 2L NC. Afebrile. Up independently. A&Ox4. LS diminished. Nonproductive cough.    Major Shift Events: none    Treatment Plan: Wean O2, IS, Lovenox, Decadron.

## 2025-04-11 ENCOUNTER — APPOINTMENT (OUTPATIENT)
Dept: GENERAL RADIOLOGY | Facility: CLINIC | Age: 70
End: 2025-04-11
Attending: STUDENT IN AN ORGANIZED HEALTH CARE EDUCATION/TRAINING PROGRAM
Payer: COMMERCIAL

## 2025-04-11 ENCOUNTER — HOSPITAL ENCOUNTER (INPATIENT)
Facility: CLINIC | Age: 70
LOS: 4 days | Discharge: NURSING, REHAB OR LONG-TERM CARE | End: 2025-04-16
Attending: STUDENT IN AN ORGANIZED HEALTH CARE EDUCATION/TRAINING PROGRAM | Admitting: ORTHOPAEDIC SURGERY
Payer: COMMERCIAL

## 2025-04-11 DIAGNOSIS — Z98.890 S/P ORIF (OPEN REDUCTION INTERNAL FIXATION) FRACTURE: Primary | ICD-10-CM

## 2025-04-11 DIAGNOSIS — S72.141A CLOSED DISPLACED INTERTROCHANTERIC FRACTURE OF RIGHT FEMUR, INITIAL ENCOUNTER (H): ICD-10-CM

## 2025-04-11 DIAGNOSIS — U07.1 COVID-19: ICD-10-CM

## 2025-04-11 DIAGNOSIS — Z87.81 S/P ORIF (OPEN REDUCTION INTERNAL FIXATION) FRACTURE: Primary | ICD-10-CM

## 2025-04-11 PROBLEM — M85.89 OSTEOPENIA OF MULTIPLE SITES: Chronic | Status: ACTIVE | Noted: 2025-04-11

## 2025-04-11 PROCEDURE — 250N000011 HC RX IP 250 OP 636: Mod: JZ | Performed by: HOSPITALIST

## 2025-04-11 PROCEDURE — 99285 EMERGENCY DEPT VISIT HI MDM: CPT | Mod: 25

## 2025-04-11 PROCEDURE — G0378 HOSPITAL OBSERVATION PER HR: HCPCS

## 2025-04-11 PROCEDURE — 99222 1ST HOSP IP/OBS MODERATE 55: CPT | Mod: 95 | Performed by: HOSPITALIST

## 2025-04-11 PROCEDURE — 99285 EMERGENCY DEPT VISIT HI MDM: CPT | Mod: 25 | Performed by: STUDENT IN AN ORGANIZED HEALTH CARE EDUCATION/TRAINING PROGRAM

## 2025-04-11 PROCEDURE — 250N000013 HC RX MED GY IP 250 OP 250 PS 637: Performed by: STUDENT IN AN ORGANIZED HEALTH CARE EDUCATION/TRAINING PROGRAM

## 2025-04-11 PROCEDURE — 73502 X-RAY EXAM HIP UNI 2-3 VIEWS: CPT

## 2025-04-11 PROCEDURE — 96374 THER/PROPH/DIAG INJ IV PUSH: CPT

## 2025-04-11 PROCEDURE — 93005 ELECTROCARDIOGRAM TRACING: CPT

## 2025-04-11 PROCEDURE — 96375 TX/PRO/DX INJ NEW DRUG ADDON: CPT

## 2025-04-11 PROCEDURE — 258N000003 HC RX IP 258 OP 636: Performed by: HOSPITALIST

## 2025-04-11 PROCEDURE — 250N000011 HC RX IP 250 OP 636: Mod: JZ | Performed by: STUDENT IN AN ORGANIZED HEALTH CARE EDUCATION/TRAINING PROGRAM

## 2025-04-11 RX ORDER — ACETAMINOPHEN 325 MG/1
650 TABLET ORAL EVERY 4 HOURS PRN
Status: DISCONTINUED | OUTPATIENT
Start: 2025-04-11 | End: 2025-04-12

## 2025-04-11 RX ORDER — NALOXONE HYDROCHLORIDE 0.4 MG/ML
0.4 INJECTION, SOLUTION INTRAMUSCULAR; INTRAVENOUS; SUBCUTANEOUS
Status: DISCONTINUED | OUTPATIENT
Start: 2025-04-11 | End: 2025-04-12

## 2025-04-11 RX ORDER — NALOXONE HYDROCHLORIDE 0.4 MG/ML
0.2 INJECTION, SOLUTION INTRAMUSCULAR; INTRAVENOUS; SUBCUTANEOUS
Status: DISCONTINUED | OUTPATIENT
Start: 2025-04-11 | End: 2025-04-12

## 2025-04-11 RX ORDER — ONDANSETRON 4 MG/1
4 TABLET, ORALLY DISINTEGRATING ORAL EVERY 6 HOURS PRN
Status: DISCONTINUED | OUTPATIENT
Start: 2025-04-11 | End: 2025-04-12

## 2025-04-11 RX ORDER — ACETAMINOPHEN 500 MG
1000 TABLET ORAL ONCE
Status: COMPLETED | OUTPATIENT
Start: 2025-04-11 | End: 2025-04-11

## 2025-04-11 RX ORDER — PROCHLORPERAZINE MALEATE 5 MG/1
5 TABLET ORAL EVERY 6 HOURS PRN
Status: DISCONTINUED | OUTPATIENT
Start: 2025-04-11 | End: 2025-04-12

## 2025-04-11 RX ORDER — SODIUM CHLORIDE, SODIUM LACTATE, POTASSIUM CHLORIDE, CALCIUM CHLORIDE 600; 310; 30; 20 MG/100ML; MG/100ML; MG/100ML; MG/100ML
INJECTION, SOLUTION INTRAVENOUS CONTINUOUS
Status: DISCONTINUED | OUTPATIENT
Start: 2025-04-11 | End: 2025-04-12

## 2025-04-11 RX ORDER — BISACODYL 10 MG
10 SUPPOSITORY, RECTAL RECTAL DAILY PRN
Status: DISCONTINUED | OUTPATIENT
Start: 2025-04-11 | End: 2025-04-12

## 2025-04-11 RX ORDER — ACETAMINOPHEN 650 MG/1
650 SUPPOSITORY RECTAL EVERY 4 HOURS PRN
Status: DISCONTINUED | OUTPATIENT
Start: 2025-04-11 | End: 2025-04-12

## 2025-04-11 RX ORDER — ONDANSETRON 2 MG/ML
4 INJECTION INTRAMUSCULAR; INTRAVENOUS EVERY 6 HOURS PRN
Status: DISCONTINUED | OUTPATIENT
Start: 2025-04-11 | End: 2025-04-12

## 2025-04-11 RX ORDER — MORPHINE SULFATE 4 MG/ML
4 INJECTION, SOLUTION INTRAMUSCULAR; INTRAVENOUS EVERY 4 HOURS PRN
Status: DISCONTINUED | OUTPATIENT
Start: 2025-04-11 | End: 2025-04-12

## 2025-04-11 RX ADMIN — ACETAMINOPHEN 1000 MG: 500 TABLET ORAL at 20:27

## 2025-04-11 RX ADMIN — SODIUM CHLORIDE, SODIUM LACTATE, POTASSIUM CHLORIDE, AND CALCIUM CHLORIDE: .6; .31; .03; .02 INJECTION, SOLUTION INTRAVENOUS at 22:17

## 2025-04-11 RX ADMIN — MORPHINE SULFATE 4 MG: 4 INJECTION, SOLUTION INTRAMUSCULAR; INTRAVENOUS at 20:24

## 2025-04-11 RX ADMIN — HYDROMORPHONE HYDROCHLORIDE 1 MG: 1 INJECTION, SOLUTION INTRAMUSCULAR; INTRAVENOUS; SUBCUTANEOUS at 23:20

## 2025-04-11 ASSESSMENT — ACTIVITIES OF DAILY LIVING (ADL)
ADLS_ACUITY_SCORE: 23
ADLS_ACUITY_SCORE: 25
ADLS_ACUITY_SCORE: 46

## 2025-04-11 ASSESSMENT — COLUMBIA-SUICIDE SEVERITY RATING SCALE - C-SSRS
6. HAVE YOU EVER DONE ANYTHING, STARTED TO DO ANYTHING, OR PREPARED TO DO ANYTHING TO END YOUR LIFE?: NO
2. HAVE YOU ACTUALLY HAD ANY THOUGHTS OF KILLING YOURSELF IN THE PAST MONTH?: NO
1. IN THE PAST MONTH, HAVE YOU WISHED YOU WERE DEAD OR WISHED YOU COULD GO TO SLEEP AND NOT WAKE UP?: NO

## 2025-04-11 NOTE — LETTER
Transition Communication Hand-off for Care Transitions to Next Level of Care Provider    Name: Sabina Rm  : 1955  MRN #: [id number]  Primary Care Provider: Gui Magaña     Primary Clinic: [address] [place] [id number]     Reason for Hospitalization:  Closed displaced intertrochanteric fracture of right femur, initial encounter (H) [S72.141A]  S/P ORIF (open reduction internal fixation) fracture [Z98.890, Z87.81]  Admit Date/Time: 2025  6:53 PM  Discharge Date: 4/15/25  Payor Source: Payor: MEDICA / Plan: [employer] / Product Type: HMO /              Reason for Communication Hand-off Referral: Fragility    Discharge Plan:  Discharge Plan:      Flowsheet Row Most Recent Value   Disposition Comments Irving mccormick TCTHONY             Concern for non-adherence with plan of care:   Y/N N  Discharge Needs Assessment:  Needs      Flowsheet Row Most Recent Value   Equipment Currently Used at Home none   # of Referrals Placed by CM External Care Coordination, Post Acute Facilities, Transportation              Follow-up plan:  No future appointments.    Any outstanding tests or procedures:            Supplies       Future Labs/Procedures    C[employer] DME     Comments:    DME Documentation: Describe the reason for need to support medical necessity: Impaired gait status post hip surgery. I, the undersigned, certify that the above prescribed supplies are medically necessary for this patient and is both reasonable and necessary in reference to accepted standards of medical practice in the treatment of this patient's condition and is not prescribed as a convenience.    Crutches DME     Comments:    DME Documentation: Describe the reason for need to support medical necessity: Impaired gait status post hip surgery. I, the undersigned, certify that the above prescribed supplies are medically necessary for this patient and is both reasonable and necessary in reference to accepted  standards of medical practice in the treatment of this patient's condition and is not prescribed as a convenience.    Tiago HANSEN     Comments:    DME Documentation: Describe the reason for need to support medical necessity: Impaired gait status post hip surgery. I, the undersigned, certify that the above prescribed supplies are medically necessary for this patient and is both reasonable and necessary in reference to accepted standards of medical practice in the treatment of this patient's condition and is not prescribed as a convenience.            Key Recommendations:      Blaire Phillips RN    AVS/Discharge Summary is the source of truth; this is a helpful guide for improved communication of patient story

## 2025-04-11 NOTE — ED TRIAGE NOTES
Patient arrives via EMS after tripping and falling on R hip. 100mcg of Fentanyl and 2 mg of Versed given en route. Patient c/o 5/10 pain.     Triage Assessment (Adult)       Row Name 04/11/25 9494          Triage Assessment    Airway WDL WDL        Respiratory WDL    Respiratory WDL WDL        Skin Circulation/Temperature WDL    Skin Circulation/Temperature WDL WDL        Cardiac WDL    Cardiac WDL WDL

## 2025-04-12 ENCOUNTER — ANESTHESIA EVENT (OUTPATIENT)
Dept: SURGERY | Facility: CLINIC | Age: 70
End: 2025-04-12
Payer: COMMERCIAL

## 2025-04-12 ENCOUNTER — ANESTHESIA (OUTPATIENT)
Dept: SURGERY | Facility: CLINIC | Age: 70
End: 2025-04-12
Payer: COMMERCIAL

## 2025-04-12 ENCOUNTER — APPOINTMENT (OUTPATIENT)
Dept: GENERAL RADIOLOGY | Facility: CLINIC | Age: 70
End: 2025-04-12
Attending: ORTHOPAEDIC SURGERY
Payer: COMMERCIAL

## 2025-04-12 PROBLEM — Z98.890 S/P ORIF (OPEN REDUCTION INTERNAL FIXATION) FRACTURE: Status: ACTIVE | Noted: 2025-04-12

## 2025-04-12 PROBLEM — Z87.81 S/P ORIF (OPEN REDUCTION INTERNAL FIXATION) FRACTURE: Status: ACTIVE | Noted: 2025-04-12

## 2025-04-12 LAB
ANION GAP SERPL CALCULATED.3IONS-SCNC: 7 MMOL/L (ref 7–15)
BASOPHILS # BLD AUTO: 0.1 10E3/UL (ref 0–0.2)
BASOPHILS NFR BLD AUTO: 1 %
BUN SERPL-MCNC: 8.7 MG/DL (ref 8–23)
CALCIUM SERPL-MCNC: 8.6 MG/DL (ref 8.8–10.4)
CHLORIDE SERPL-SCNC: 104 MMOL/L (ref 98–107)
CREAT SERPL-MCNC: 0.67 MG/DL (ref 0.51–0.95)
EGFRCR SERPLBLD CKD-EPI 2021: >90 ML/MIN/1.73M2
EOSINOPHIL # BLD AUTO: 0.1 10E3/UL (ref 0–0.7)
EOSINOPHIL NFR BLD AUTO: 1 %
ERYTHROCYTE [DISTWIDTH] IN BLOOD BY AUTOMATED COUNT: 13.2 % (ref 10–15)
GLUCOSE SERPL-MCNC: 106 MG/DL (ref 70–99)
HCO3 SERPL-SCNC: 26 MMOL/L (ref 22–29)
HCT VFR BLD AUTO: 37.9 % (ref 35–47)
HGB BLD-MCNC: 12.9 G/DL (ref 11.7–15.7)
IMM GRANULOCYTES # BLD: 0.1 10E3/UL
IMM GRANULOCYTES NFR BLD: 1 %
LYMPHOCYTES # BLD AUTO: 1.9 10E3/UL (ref 0.8–5.3)
LYMPHOCYTES NFR BLD AUTO: 24 %
MCH RBC QN AUTO: 30.3 PG (ref 26.5–33)
MCHC RBC AUTO-ENTMCNC: 34 G/DL (ref 31.5–36.5)
MCV RBC AUTO: 89 FL (ref 78–100)
MONOCYTES # BLD AUTO: 0.8 10E3/UL (ref 0–1.3)
MONOCYTES NFR BLD AUTO: 11 %
NEUTROPHILS # BLD AUTO: 4.9 10E3/UL (ref 1.6–8.3)
NEUTROPHILS NFR BLD AUTO: 63 %
NRBC # BLD AUTO: 0 10E3/UL
NRBC BLD AUTO-RTO: 0 /100
PLAT MORPH BLD: ABNORMAL
PLATELET # BLD AUTO: 170 10E3/UL (ref 150–450)
POTASSIUM SERPL-SCNC: 4 MMOL/L (ref 3.4–5.3)
RBC # BLD AUTO: 4.26 10E6/UL (ref 3.8–5.2)
RBC MORPH BLD: ABNORMAL
SODIUM SERPL-SCNC: 137 MMOL/L (ref 135–145)
VARIANT LYMPHS BLD QL SMEAR: PRESENT
VIT D+METAB SERPL-MCNC: 35 NG/ML (ref 20–50)
WBC # BLD AUTO: 7.8 10E3/UL (ref 4–11)

## 2025-04-12 PROCEDURE — 250N000025 HC SEVOFLURANE, PER MIN: Performed by: ORTHOPAEDIC SURGERY

## 2025-04-12 PROCEDURE — 258N000003 HC RX IP 258 OP 636: Performed by: HOSPITALIST

## 2025-04-12 PROCEDURE — 258N000003 HC RX IP 258 OP 636: Performed by: NURSE ANESTHETIST, CERTIFIED REGISTERED

## 2025-04-12 PROCEDURE — 99232 SBSQ HOSP IP/OBS MODERATE 35: CPT | Performed by: NURSE PRACTITIONER

## 2025-04-12 PROCEDURE — 370N000017 HC ANESTHESIA TECHNICAL FEE, PER MIN: Performed by: ORTHOPAEDIC SURGERY

## 2025-04-12 PROCEDURE — 360N000084 HC SURGERY LEVEL 4 W/ FLUORO, PER MIN: Performed by: ORTHOPAEDIC SURGERY

## 2025-04-12 PROCEDURE — 250N000011 HC RX IP 250 OP 636: Performed by: NURSE PRACTITIONER

## 2025-04-12 PROCEDURE — 0QS606Z REPOSITION RIGHT UPPER FEMUR WITH INTRAMEDULLARY INTERNAL FIXATION DEVICE, OPEN APPROACH: ICD-10-PCS | Performed by: ORTHOPAEDIC SURGERY

## 2025-04-12 PROCEDURE — 250N000011 HC RX IP 250 OP 636: Mod: JZ | Performed by: NURSE ANESTHETIST, CERTIFIED REGISTERED

## 2025-04-12 PROCEDURE — 258N000003 HC RX IP 258 OP 636: Performed by: NURSE PRACTITIONER

## 2025-04-12 PROCEDURE — 96376 TX/PRO/DX INJ SAME DRUG ADON: CPT

## 2025-04-12 PROCEDURE — C1713 ANCHOR/SCREW BN/BN,TIS/BN: HCPCS | Performed by: ORTHOPAEDIC SURGERY

## 2025-04-12 PROCEDURE — 80048 BASIC METABOLIC PNL TOTAL CA: CPT | Performed by: HOSPITALIST

## 2025-04-12 PROCEDURE — 82306 VITAMIN D 25 HYDROXY: CPT | Performed by: HOSPITALIST

## 2025-04-12 PROCEDURE — 85025 COMPLETE CBC W/AUTO DIFF WBC: CPT | Performed by: HOSPITALIST

## 2025-04-12 PROCEDURE — 99223 1ST HOSP IP/OBS HIGH 75: CPT | Mod: 57 | Performed by: ORTHOPAEDIC SURGERY

## 2025-04-12 PROCEDURE — 27245 TREAT THIGH FRACTURE: CPT | Mod: RT | Performed by: ORTHOPAEDIC SURGERY

## 2025-04-12 PROCEDURE — 36415 COLL VENOUS BLD VENIPUNCTURE: CPT | Performed by: HOSPITALIST

## 2025-04-12 PROCEDURE — 250N000009 HC RX 250: Performed by: NURSE ANESTHETIST, CERTIFIED REGISTERED

## 2025-04-12 PROCEDURE — 250N000011 HC RX IP 250 OP 636: Mod: JZ | Performed by: HOSPITALIST

## 2025-04-12 PROCEDURE — 82310 ASSAY OF CALCIUM: CPT | Performed by: HOSPITALIST

## 2025-04-12 PROCEDURE — 120N000001 HC R&B MED SURG/OB

## 2025-04-12 PROCEDURE — 27245 TREAT THIGH FRACTURE: CPT | Mod: AS | Performed by: NURSE PRACTITIONER

## 2025-04-12 PROCEDURE — 999N000179 XR SURGERY CARM FLUORO LESS THAN 5 MIN W STILLS

## 2025-04-12 PROCEDURE — 710N000010 HC RECOVERY PHASE 1, LEVEL 2, PER MIN: Performed by: ORTHOPAEDIC SURGERY

## 2025-04-12 PROCEDURE — G0378 HOSPITAL OBSERVATION PER HR: HCPCS

## 2025-04-12 PROCEDURE — 250N000013 HC RX MED GY IP 250 OP 250 PS 637: Performed by: NURSE PRACTITIONER

## 2025-04-12 PROCEDURE — 272N000001 HC OR GENERAL SUPPLY STERILE: Performed by: ORTHOPAEDIC SURGERY

## 2025-04-12 DEVICE — IMPLANTABLE DEVICE: Type: IMPLANTABLE DEVICE | Site: HIP | Status: FUNCTIONAL

## 2025-04-12 DEVICE — PIN FIXATION GAMMA 3.2/3.9MM 4MM 450MM TEMP TAPER 1420-0065S: Type: IMPLANTABLE DEVICE | Site: HIP | Status: FUNCTIONAL

## 2025-04-12 DEVICE — WIRE FX 3MM 285MM KRSH STRL LF: Type: IMPLANTABLE DEVICE | Site: HIP | Status: FUNCTIONAL

## 2025-04-12 RX ORDER — FENTANYL CITRATE 50 UG/ML
25 INJECTION, SOLUTION INTRAMUSCULAR; INTRAVENOUS EVERY 5 MIN PRN
Status: DISCONTINUED | OUTPATIENT
Start: 2025-04-12 | End: 2025-04-12

## 2025-04-12 RX ORDER — NALOXONE HYDROCHLORIDE 0.4 MG/ML
0.1 INJECTION, SOLUTION INTRAMUSCULAR; INTRAVENOUS; SUBCUTANEOUS
Status: DISCONTINUED | OUTPATIENT
Start: 2025-04-12 | End: 2025-04-12

## 2025-04-12 RX ORDER — LIDOCAINE 40 MG/G
CREAM TOPICAL
Status: DISCONTINUED | OUTPATIENT
Start: 2025-04-12 | End: 2025-04-16 | Stop reason: HOSPADM

## 2025-04-12 RX ORDER — ACETAMINOPHEN 325 MG/1
650 TABLET ORAL EVERY 4 HOURS PRN
Status: DISCONTINUED | OUTPATIENT
Start: 2025-04-12 | End: 2025-04-15

## 2025-04-12 RX ORDER — PROCHLORPERAZINE MALEATE 5 MG/1
5 TABLET ORAL EVERY 6 HOURS PRN
Status: DISCONTINUED | OUTPATIENT
Start: 2025-04-12 | End: 2025-04-16 | Stop reason: HOSPADM

## 2025-04-12 RX ORDER — ACETAMINOPHEN 650 MG/1
650 SUPPOSITORY RECTAL EVERY 4 HOURS PRN
Status: DISCONTINUED | OUTPATIENT
Start: 2025-04-12 | End: 2025-04-15

## 2025-04-12 RX ORDER — DEXAMETHASONE SODIUM PHOSPHATE 10 MG/ML
INJECTION, SOLUTION INTRAMUSCULAR; INTRAVENOUS PRN
Status: DISCONTINUED | OUTPATIENT
Start: 2025-04-12 | End: 2025-04-12

## 2025-04-12 RX ORDER — CEFAZOLIN SODIUM/WATER 2 G/20 ML
SYRINGE (ML) INTRAVENOUS PRN
Status: DISCONTINUED | OUTPATIENT
Start: 2025-04-12 | End: 2025-04-12

## 2025-04-12 RX ORDER — HYDROMORPHONE HYDROCHLORIDE 1 MG/ML
1 INJECTION, SOLUTION INTRAMUSCULAR; INTRAVENOUS; SUBCUTANEOUS
Status: DISCONTINUED | OUTPATIENT
Start: 2025-04-12 | End: 2025-04-12

## 2025-04-12 RX ORDER — ONDANSETRON 2 MG/ML
4 INJECTION INTRAMUSCULAR; INTRAVENOUS EVERY 30 MIN PRN
Status: DISCONTINUED | OUTPATIENT
Start: 2025-04-12 | End: 2025-04-12

## 2025-04-12 RX ORDER — HYDROMORPHONE HCL IN WATER/PF 6 MG/30 ML
0.2 PATIENT CONTROLLED ANALGESIA SYRINGE INTRAVENOUS
Status: DISCONTINUED | OUTPATIENT
Start: 2025-04-12 | End: 2025-04-15

## 2025-04-12 RX ORDER — ONDANSETRON 2 MG/ML
INJECTION INTRAMUSCULAR; INTRAVENOUS PRN
Status: DISCONTINUED | OUTPATIENT
Start: 2025-04-12 | End: 2025-04-12

## 2025-04-12 RX ORDER — CEFAZOLIN SODIUM 2 G/50ML
2 SOLUTION INTRAVENOUS SEE ADMIN INSTRUCTIONS
Status: DISCONTINUED | OUTPATIENT
Start: 2025-04-12 | End: 2025-04-12

## 2025-04-12 RX ORDER — LIDOCAINE HYDROCHLORIDE 20 MG/ML
INJECTION, SOLUTION INFILTRATION; PERINEURAL PRN
Status: DISCONTINUED | OUTPATIENT
Start: 2025-04-12 | End: 2025-04-12

## 2025-04-12 RX ORDER — PROCHLORPERAZINE MALEATE 5 MG/1
5 TABLET ORAL EVERY 6 HOURS PRN
Status: DISCONTINUED | OUTPATIENT
Start: 2025-04-12 | End: 2025-04-12

## 2025-04-12 RX ORDER — CEFAZOLIN SODIUM 2 G/50ML
2 SOLUTION INTRAVENOUS EVERY 8 HOURS
Status: COMPLETED | OUTPATIENT
Start: 2025-04-12 | End: 2025-04-13

## 2025-04-12 RX ORDER — OXYCODONE HYDROCHLORIDE 5 MG/1
5 TABLET ORAL EVERY 4 HOURS PRN
Status: DISCONTINUED | OUTPATIENT
Start: 2025-04-12 | End: 2025-04-14

## 2025-04-12 RX ORDER — HYDROMORPHONE HYDROCHLORIDE 1 MG/ML
0.5 INJECTION, SOLUTION INTRAMUSCULAR; INTRAVENOUS; SUBCUTANEOUS EVERY 5 MIN PRN
Status: DISCONTINUED | OUTPATIENT
Start: 2025-04-12 | End: 2025-04-12

## 2025-04-12 RX ORDER — SODIUM CHLORIDE, SODIUM LACTATE, POTASSIUM CHLORIDE, CALCIUM CHLORIDE 600; 310; 30; 20 MG/100ML; MG/100ML; MG/100ML; MG/100ML
INJECTION, SOLUTION INTRAVENOUS CONTINUOUS
Status: DISCONTINUED | OUTPATIENT
Start: 2025-04-12 | End: 2025-04-12

## 2025-04-12 RX ORDER — BISACODYL 10 MG
10 SUPPOSITORY, RECTAL RECTAL DAILY PRN
Status: DISCONTINUED | OUTPATIENT
Start: 2025-04-12 | End: 2025-04-15

## 2025-04-12 RX ORDER — NALOXONE HYDROCHLORIDE 0.4 MG/ML
0.2 INJECTION, SOLUTION INTRAMUSCULAR; INTRAVENOUS; SUBCUTANEOUS
Status: DISCONTINUED | OUTPATIENT
Start: 2025-04-12 | End: 2025-04-12

## 2025-04-12 RX ORDER — HYDROMORPHONE HYDROCHLORIDE 1 MG/ML
0.5 INJECTION, SOLUTION INTRAMUSCULAR; INTRAVENOUS; SUBCUTANEOUS
Status: DISCONTINUED | OUTPATIENT
Start: 2025-04-12 | End: 2025-04-12

## 2025-04-12 RX ORDER — ONDANSETRON 2 MG/ML
4 INJECTION INTRAMUSCULAR; INTRAVENOUS EVERY 6 HOURS PRN
Status: DISCONTINUED | OUTPATIENT
Start: 2025-04-12 | End: 2025-04-12

## 2025-04-12 RX ORDER — HYDROMORPHONE HYDROCHLORIDE 1 MG/ML
0.2 INJECTION, SOLUTION INTRAMUSCULAR; INTRAVENOUS; SUBCUTANEOUS EVERY 5 MIN PRN
Status: DISCONTINUED | OUTPATIENT
Start: 2025-04-12 | End: 2025-04-12

## 2025-04-12 RX ORDER — FENTANYL CITRATE 50 UG/ML
INJECTION, SOLUTION INTRAMUSCULAR; INTRAVENOUS PRN
Status: DISCONTINUED | OUTPATIENT
Start: 2025-04-12 | End: 2025-04-12

## 2025-04-12 RX ORDER — PROPOFOL 10 MG/ML
INJECTION, EMULSION INTRAVENOUS PRN
Status: DISCONTINUED | OUTPATIENT
Start: 2025-04-12 | End: 2025-04-12

## 2025-04-12 RX ORDER — NALOXONE HYDROCHLORIDE 0.4 MG/ML
0.4 INJECTION, SOLUTION INTRAMUSCULAR; INTRAVENOUS; SUBCUTANEOUS
Status: DISCONTINUED | OUTPATIENT
Start: 2025-04-12 | End: 2025-04-12

## 2025-04-12 RX ORDER — ONDANSETRON 2 MG/ML
4 INJECTION INTRAMUSCULAR; INTRAVENOUS EVERY 6 HOURS PRN
Status: DISCONTINUED | OUTPATIENT
Start: 2025-04-12 | End: 2025-04-16 | Stop reason: HOSPADM

## 2025-04-12 RX ORDER — BISACODYL 10 MG
10 SUPPOSITORY, RECTAL RECTAL DAILY PRN
Status: DISCONTINUED | OUTPATIENT
Start: 2025-04-15 | End: 2025-04-16 | Stop reason: HOSPADM

## 2025-04-12 RX ORDER — ONDANSETRON 4 MG/1
4 TABLET, ORALLY DISINTEGRATING ORAL EVERY 6 HOURS PRN
Status: DISCONTINUED | OUTPATIENT
Start: 2025-04-12 | End: 2025-04-12

## 2025-04-12 RX ORDER — SODIUM CHLORIDE, SODIUM LACTATE, POTASSIUM CHLORIDE, CALCIUM CHLORIDE 600; 310; 30; 20 MG/100ML; MG/100ML; MG/100ML; MG/100ML
INJECTION, SOLUTION INTRAVENOUS CONTINUOUS
Status: DISCONTINUED | OUTPATIENT
Start: 2025-04-12 | End: 2025-04-15

## 2025-04-12 RX ORDER — ONDANSETRON 4 MG/1
4 TABLET, ORALLY DISINTEGRATING ORAL EVERY 6 HOURS PRN
Status: DISCONTINUED | OUTPATIENT
Start: 2025-04-12 | End: 2025-04-16 | Stop reason: HOSPADM

## 2025-04-12 RX ORDER — MEPERIDINE HYDROCHLORIDE 25 MG/ML
12.5 INJECTION INTRAMUSCULAR; INTRAVENOUS; SUBCUTANEOUS EVERY 5 MIN PRN
Status: DISCONTINUED | OUTPATIENT
Start: 2025-04-12 | End: 2025-04-12

## 2025-04-12 RX ORDER — AMOXICILLIN 250 MG
1 CAPSULE ORAL 2 TIMES DAILY
Status: DISCONTINUED | OUTPATIENT
Start: 2025-04-12 | End: 2025-04-14

## 2025-04-12 RX ORDER — FENTANYL CITRATE 50 UG/ML
50 INJECTION, SOLUTION INTRAMUSCULAR; INTRAVENOUS EVERY 5 MIN PRN
Status: DISCONTINUED | OUTPATIENT
Start: 2025-04-12 | End: 2025-04-12

## 2025-04-12 RX ORDER — FAMOTIDINE 20 MG/1
20 TABLET, FILM COATED ORAL 2 TIMES DAILY
Status: DISCONTINUED | OUTPATIENT
Start: 2025-04-12 | End: 2025-04-13

## 2025-04-12 RX ORDER — ALBUTEROL SULFATE 0.83 MG/ML
2.5 SOLUTION RESPIRATORY (INHALATION) EVERY 4 HOURS PRN
Status: DISCONTINUED | OUTPATIENT
Start: 2025-04-12 | End: 2025-04-12

## 2025-04-12 RX ORDER — CEFAZOLIN SODIUM 2 G/50ML
2 SOLUTION INTRAVENOUS
Status: DISCONTINUED | OUTPATIENT
Start: 2025-04-12 | End: 2025-04-12

## 2025-04-12 RX ORDER — POLYETHYLENE GLYCOL 3350 17 G/17G
17 POWDER, FOR SOLUTION ORAL DAILY
Status: DISCONTINUED | OUTPATIENT
Start: 2025-04-13 | End: 2025-04-16 | Stop reason: HOSPADM

## 2025-04-12 RX ORDER — LABETALOL HYDROCHLORIDE 5 MG/ML
10 INJECTION, SOLUTION INTRAVENOUS
Status: DISCONTINUED | OUTPATIENT
Start: 2025-04-12 | End: 2025-04-12

## 2025-04-12 RX ORDER — ONDANSETRON 4 MG/1
4 TABLET, ORALLY DISINTEGRATING ORAL EVERY 30 MIN PRN
Status: DISCONTINUED | OUTPATIENT
Start: 2025-04-12 | End: 2025-04-12

## 2025-04-12 RX ORDER — DEXAMETHASONE SODIUM PHOSPHATE 10 MG/ML
4 INJECTION, SOLUTION INTRAMUSCULAR; INTRAVENOUS
Status: DISCONTINUED | OUTPATIENT
Start: 2025-04-12 | End: 2025-04-12

## 2025-04-12 RX ORDER — HYDROXYZINE HYDROCHLORIDE 10 MG/1
10 TABLET, FILM COATED ORAL EVERY 6 HOURS PRN
Status: DISCONTINUED | OUTPATIENT
Start: 2025-04-12 | End: 2025-04-16 | Stop reason: HOSPADM

## 2025-04-12 RX ORDER — HYDROMORPHONE HCL IN WATER/PF 6 MG/30 ML
0.4 PATIENT CONTROLLED ANALGESIA SYRINGE INTRAVENOUS
Status: DISCONTINUED | OUTPATIENT
Start: 2025-04-12 | End: 2025-04-15

## 2025-04-12 RX ADMIN — CEFAZOLIN SODIUM 2 G: 2 SOLUTION INTRAVENOUS at 17:12

## 2025-04-12 RX ADMIN — ONDANSETRON 4 MG: 2 INJECTION INTRAMUSCULAR; INTRAVENOUS at 09:43

## 2025-04-12 RX ADMIN — HYDROMORPHONE HYDROCHLORIDE 0.4 MG: 0.2 INJECTION, SOLUTION INTRAMUSCULAR; INTRAVENOUS; SUBCUTANEOUS at 15:10

## 2025-04-12 RX ADMIN — LIDOCAINE HYDROCHLORIDE 50 MG: 20 INJECTION, SOLUTION INFILTRATION; PERINEURAL at 09:00

## 2025-04-12 RX ADMIN — OXYCODONE HYDROCHLORIDE 5 MG: 5 TABLET ORAL at 17:12

## 2025-04-12 RX ADMIN — PHENYLEPHRINE HYDROCHLORIDE 100 MCG: 10 INJECTION INTRAVENOUS at 09:48

## 2025-04-12 RX ADMIN — PHENYLEPHRINE HYDROCHLORIDE 150 MCG: 10 INJECTION INTRAVENOUS at 09:24

## 2025-04-12 RX ADMIN — HYDROMORPHONE HYDROCHLORIDE 1 MG: 1 INJECTION, SOLUTION INTRAMUSCULAR; INTRAVENOUS; SUBCUTANEOUS at 03:21

## 2025-04-12 RX ADMIN — OXYCODONE HYDROCHLORIDE 5 MG: 5 TABLET ORAL at 11:29

## 2025-04-12 RX ADMIN — OXYCODONE HYDROCHLORIDE 5 MG: 5 TABLET ORAL at 21:02

## 2025-04-12 RX ADMIN — Medication 2 G: at 08:50

## 2025-04-12 RX ADMIN — BUPIVACAINE HYDROCHLORIDE 20 ML: 2.5 INJECTION, SOLUTION EPIDURAL; INFILTRATION; INTRACAUDAL at 10:21

## 2025-04-12 RX ADMIN — PROPOFOL 200 MG: 10 INJECTION, EMULSION INTRAVENOUS at 09:00

## 2025-04-12 RX ADMIN — PHENYLEPHRINE HYDROCHLORIDE 100 MCG: 10 INJECTION INTRAVENOUS at 09:17

## 2025-04-12 RX ADMIN — SODIUM CHLORIDE, SODIUM LACTATE, POTASSIUM CHLORIDE, AND CALCIUM CHLORIDE: .6; .31; .03; .02 INJECTION, SOLUTION INTRAVENOUS at 23:03

## 2025-04-12 RX ADMIN — Medication 200 MG: at 10:07

## 2025-04-12 RX ADMIN — FAMOTIDINE 20 MG: 20 TABLET, FILM COATED ORAL at 20:50

## 2025-04-12 RX ADMIN — HYDROMORPHONE HYDROCHLORIDE 0.5 MG: 1 INJECTION, SOLUTION INTRAMUSCULAR; INTRAVENOUS; SUBCUTANEOUS at 09:25

## 2025-04-12 RX ADMIN — PHENYLEPHRINE HYDROCHLORIDE 100 MCG: 10 INJECTION INTRAVENOUS at 09:39

## 2025-04-12 RX ADMIN — ACETAMINOPHEN 650 MG: 325 TABLET, FILM COATED ORAL at 21:02

## 2025-04-12 RX ADMIN — FENTANYL CITRATE 100 MCG: 50 INJECTION INTRAMUSCULAR; INTRAVENOUS at 08:53

## 2025-04-12 RX ADMIN — ROCURONIUM BROMIDE 50 MG: 50 INJECTION, SOLUTION INTRAVENOUS at 09:00

## 2025-04-12 RX ADMIN — PHENYLEPHRINE HYDROCHLORIDE 100 MCG: 10 INJECTION INTRAVENOUS at 09:54

## 2025-04-12 RX ADMIN — DEXAMETHASONE SODIUM PHOSPHATE 5 MG: 10 INJECTION, SOLUTION INTRAMUSCULAR; INTRAVENOUS at 09:12

## 2025-04-12 RX ADMIN — ACETAMINOPHEN 650 MG: 325 TABLET, FILM COATED ORAL at 15:10

## 2025-04-12 RX ADMIN — PHENYLEPHRINE HYDROCHLORIDE 100 MCG: 10 INJECTION INTRAVENOUS at 09:12

## 2025-04-12 RX ADMIN — SENNOSIDES AND DOCUSATE SODIUM 1 TABLET: 50; 8.6 TABLET ORAL at 20:50

## 2025-04-12 RX ADMIN — HYDROMORPHONE HYDROCHLORIDE 1 MG: 1 INJECTION, SOLUTION INTRAMUSCULAR; INTRAVENOUS; SUBCUTANEOUS at 08:30

## 2025-04-12 RX ADMIN — SODIUM CHLORIDE, SODIUM LACTATE, POTASSIUM CHLORIDE, AND CALCIUM CHLORIDE: .6; .31; .03; .02 INJECTION, SOLUTION INTRAVENOUS at 08:27

## 2025-04-12 RX ADMIN — SODIUM CHLORIDE, SODIUM LACTATE, POTASSIUM CHLORIDE, AND CALCIUM CHLORIDE: .6; .31; .03; .02 INJECTION, SOLUTION INTRAVENOUS at 12:16

## 2025-04-12 ASSESSMENT — ACTIVITIES OF DAILY LIVING (ADL)
ADLS_ACUITY_SCORE: 25
ADLS_ACUITY_SCORE: 29
ADLS_ACUITY_SCORE: 29
ADLS_ACUITY_SCORE: 25

## 2025-04-12 ASSESSMENT — LIFESTYLE VARIABLES: TOBACCO_USE: 1

## 2025-04-12 NOTE — PROGRESS NOTES
S-(situation): Patient changed to inpatient status    B-(background): Patient status change due to observation goals not being met.     A-(assessment): Pt lethargic upon arrival to floor from PACU. VSS on 3L NC. LR continuously running at 100mL/hr. Catheter output of 900 mL in surgery after a bladder scan greater than 800 mL.     R-(recommendations):  Pain management plan in place. Will monitor patient per MD orders.       Inpatient nursing criteria listed below were met:    Adult Profile completedYes  Health care directives status obtained and documented: Yes  VTE prophylaxis orders: SCD's  (FYI: Asprin is not an approved anticoagulation for DVT prophylaxis)  SCD's Documented: Yes  Vaccine assessment done and vaccine ordered if needed. Yes  My Chart patient sign up addressed and documented: Yes  Care Plan initiated: Yes  Discharge planning review completed (admission navigator) Yes

## 2025-04-12 NOTE — PROGRESS NOTES
Patient vital signs are at baseline: Yes  Patient able to ambulate as they were prior to admission or with assist devices provided by therapies during their stay:  No,  Reason:  Right Hip Fracture  Patient MUST void prior to discharge:  Yes  Patient able to tolerate oral intake:  Yes  Pain has adequate pain control using Oral analgesics:  No, IV pain medication, pre-surgical  Does patient have an identified :  No,  Reason:  no plan for discharge established at this time  Has goal D/C date and time been discussed with patient:  No,  Reason:  awaiting surgery tomorrow

## 2025-04-12 NOTE — ANESTHESIA PROCEDURE NOTES
Airway       Patient location during procedure: OR       Procedure Start/Stop Times: 4/12/2025 9:02 AM  Staff -        Performed By: CRNA  Consent for Airway        Urgency: elective  Indications and Patient Condition       Indications for airway management: hina-procedural       Induction type:intravenous       Mask difficulty assessment: 1 - vent by mask    Final Airway Details       Final airway type: endotracheal airway       Successful airway: ETT - single  Endotracheal Airway Details        ETT size (mm): 7.0       Successful intubation technique: video laryngoscopy       VL Blade Size: Glidescope 3       Grade View of Cords: 1       Secured at (cm): 20    Post intubation assessment        Placement verified by: capnometry, equal breath sounds and chest rise        Number of attempts at approach: 1       Number of other approaches attempted: 0       Secured with: plastic tape       Ease of procedure: easy       Dentition: Intact and Unchanged    Medication(s) Administered   Medication Administration Time: 4/12/2025 9:02 AM

## 2025-04-12 NOTE — ED PROVIDER NOTES
History     Chief Complaint   Patient presents with    Fall     HPI  Sabina Kulkarni Sujey is a 70 year old female who presents after a fall at home.  She notes that she stepped backwards and fell sideways onto her right hip resulting in significant pain and inability to tolerate with her weight.  She is provided significant pain medications that did help en route with EMS however denies hitting her head feeling dizzy having chest pain chest pain shortness of breath cough abdominal pain or any numbness or tingling of her extremities.  She does not take any medications there is noted to be Eliquis in her medical history she does not take these medications that she is aware of.  She otherwise healthy and has no other acute complaints.    Allergies:  Allergies   Allergen Reactions    Aspirin Anaphylaxis       Problem List:    Patient Active Problem List    Diagnosis Date Noted    Closed displaced intertrochanteric fracture of right femur, initial encounter (H) 04/11/2025     Priority: Medium    COVID-19 09/16/2021     Priority: Medium        Past Medical History:    History reviewed. No pertinent past medical history.    Past Surgical History:    History reviewed. No pertinent surgical history.    Family History:    History reviewed. No pertinent family history.    Social History:  Marital Status:  Single [1]  Social History     Tobacco Use    Smoking status: Former     Types: Cigarettes    Smokeless tobacco: Never   Substance Use Topics    Alcohol use: Yes     Comment: occ        Medications:    apixaban ANTICOAGULANT (ELIQUIS) 2.5 MG tablet  dexamethasone (DECADRON) 6 MG tablet          Review of Systems   Musculoskeletal:         Right hip pain     All other systems reviewed and are negative.      Physical Exam   BP: (!) 169/89  Pulse: 70  Temp: 98.1  F (36.7  C)  Resp: 18  Weight: 95.3 kg (210 lb)  SpO2: 95 %      Physical Exam  Vitals and nursing note reviewed.   Constitutional:       General: She is not in  acute distress.     Appearance: Normal appearance. She is normal weight. She is not diaphoretic.   HENT:      Head: Normocephalic and atraumatic.      Mouth/Throat:      Mouth: Mucous membranes are moist.   Eyes:      General: No scleral icterus.     Conjunctiva/sclera: Conjunctivae normal.   Cardiovascular:      Rate and Rhythm: Normal rate.      Heart sounds: Normal heart sounds.   Pulmonary:      Effort: No respiratory distress.      Breath sounds: Normal breath sounds.   Abdominal:      General: Abdomen is flat.   Musculoskeletal:      Cervical back: Neck supple.        Legs:       Comments: Localized tenderness to the right lateral hip.  Pain with rolling exam as well as extension and flexion of the hip.  No signs of external rotation or shortening of the limb.  Pulses are equal bilaterally.  No signs of lower leg swelling or neuro changes.   Skin:     General: Skin is warm.      Findings: No rash.   Neurological:      Mental Status: She is alert.         ED Course        Procedures                Results for orders placed or performed during the hospital encounter of 04/11/25 (from the past 24 hours)   XR Pelvis w Hip Right G/E 2 Views    Narrative    EXAM: XR PELVIS AND HIP RIGHT 2 VIEWS  LOCATION: [facility]  DATE: 4/11/2025    INDICATION: fall on lateral hip  COMPARISON: None.      Impression    IMPRESSION: Acute intertrochanteric fracture of the proximal right femur with minimal displacement. There is normal joint alignment. Mild degenerative changes throughout the pelvis. Moderate to severe degenerative changes of the lower lumbar spine.   Osteopenia.       Medications   morphine (PF) injection 4 mg (4 mg Intravenous $Given 4/11/25 2024)   acetaminophen (TYLENOL) tablet 1,000 mg (1,000 mg Oral $Given 4/11/25 2027)       Assessments & Plan (with Medical Decision Making)     I have reviewed the nursing notes.    I have reviewed the findings, diagnosis, plan and need for  follow up with the patient.    Medical Decision Making  Sabina Johnsonricia Sujey is a 70 year old female who presents after a fall at home.  She notes that she stepped backwards and fell sideways onto her right hip resulting in significant pain and inability to tolerate with her weight.  She is provided significant pain medications that did help en route with EMS however denies hitting her head feeling dizzy having chest pain chest pain shortness of breath cough abdominal pain or any numbness or tingling of her extremities.  She does not take any medications there is noted to be Eliquis in her medical history she does not take these medications that she is aware of.  She otherwise healthy and has no other acute complaints.    Blood pressure 169/86 temperature 98.1 pulse of 70 oxygen saturation of 95% room air.  She has no other extremity injuries noted on examination her abdomen is soft throughout her cardiovascular exam is unremarkable and she has no signs of head trauma.  She is otherwise pleasant throughout the conversation but does have lateral sided hip pain on palpation with no significant bruising identified.  Plain film imaging consistent with a intertrochanteric displaced fracture of her femur.    Discussed case with on-call orthopedics, Dr. Oseguera, for surgical management plan. NPO and pain control. and OR in the AM. Discussed case with hospitalist for admission. Admission orders placed after discussion with hospitalist.      New Prescriptions    No medications on file       Final diagnoses:   Closed displaced intertrochanteric fracture of right femur, initial encounter (H)       4/11/2025   [facility] EMERGENCY DEPT       Dana Cordon MD  04/11/25 2057

## 2025-04-12 NOTE — PROGRESS NOTES
Patient vital signs are at baseline: Yes  Patient able to ambulate as they were prior to admission or with assist devices provided by therapies during their stay:  No,  Reason:  schedule Rt Hip Fx surgery this AM.   Patient MUST void prior to discharge:  Yes  Patient able to tolerate oral intake:  No,  Reason:  NPO for surgery   Pain has adequate pain control using Oral analgesics:  No,  Reason:  IV medication for pain control  Does patient have an identified :  No,  Reason:  discharge planning post op  Has goal D/C date and time been discussed with patient:  No,  Reason:  awaiting surgery

## 2025-04-12 NOTE — PROCEDURES
[facility] Operative Report    Pre-operative diagnosis:  right hip displaced intratrochanteric fracture    Post-operative diagnosis: Same    Procedure:   OPEN REDUCTION INTERNAL FIXATION, RIGHT HIP-cephalomedullary nail fixation    Surgeon: Jeffery Oseguera DO  Anesthesia: General  Assistant(s): JOSÉ Loco, CNP (A advanced practice provider was necessary for his expertise, exposure and surgical assistance throughout the case.)  Estimated blood loss: Less than 50 ml  Urine output: 900 ml  Fluids: 800 ml Crystalloids  Specimens: none  Counts: correct  Complications    Implants:  Gamma 4 nail 10 mm x 170 mm x 125 degrees, 10.5 x 105 mm lag bolt, 5 x 35 distal interlocking screw    Findings: Minimally displaced intertrochanteric hip fracture.  Bone was of good quality with the leg bolt and screws.          Note:     Sabina Rm is a pleasant 70 year old year-old patient.  Presented to the hospital with the above injury. Seen and evaluated and diagnosed with an right intertrochanteric hip fracture.  The patient was evaluated and we discussed  treatment options.  In order to return the patient back to pre-injury function we discussed proceeding with the surgery as listed above. The risk, benefits, complications discussed.  Postoperative timeframe for recovery reviewed.  Once thoroughly reviewed the patient opted to proceed.     The patient was seen preoperatively.  Informed consent was verified.  The extremity was marked and the consent was signed, the patient was wheeled back to an operative suite.  Transferred to the OR fracture table without issues.  When deemed appropriate, the patient was positioned.  All bony problems were padded.  The patient was then draped in normal fashion.  A timeout was performed, the appropriate patient, surgery, extremity was verified.Antibiotics had been administrated.      The leg/hip had been positioned on the fracture table confirming reduction on both AP and  lateral imaging.  A longitudinal skin incision was made just proximal to the greater trochanter.  A guidepin was then introduced on a single pass.  It was confirmed to be in acceptable position on both AP and lateral fluoroscopy imaging.  The guidepin was advanced.  Utilizing the tissue protector the reamer was then introduced with no issues.  The nail was placed.  Using the lateral outrigger device the appropriate skin entry point on the lateral femur was found.  A skin incision was made and blunt dissection down to the femur.  The trocar was introduced.  A guidepin was then placed on a single pass.  This was placed to the center-center location of the femoral head.  This was confirmed on AP and lateral imaging.  With that completed it was measured.  The reamer was introduced with no issues.  Leg screw was then placed.  The locking mechanism engaged. The locking mechanism was confirmed to be engaged on fluoroscopy images as well as manual testing the lag bolt.      Again, using the lateral outrigger device the distal interlocking bolt skin entry point was found.  Skin incision was made and blunt dissection to the bone.  This was then drilled in standard fashion with no plunging.  It was measured and the screw was placed with good purchase.     Final fluoroscopy imaging shows an acceptable reduction with no prominence of the hardware.  The skin incisions were irrigated.  The fascia closed with 0 Vicryl followed with 2-0 Vicryl subcutaneous.  This was followed with running Monocryl and skin glue on the skin.  A sterile bandage applied and is transferred the PACU in stable condition.      The patient will be admitted back to the hospital for continued orthopedic/medical care, physical therapy, pain management, and DVT prophylaxis.    Nile Oseguera D.O.

## 2025-04-12 NOTE — ANESTHESIA PREPROCEDURE EVALUATION
Anesthesia Pre-Procedure Evaluation    Patient: Sabina Rm   MRN: [id number] : 1955        Procedure : Procedure(s):  INTERNAL FIXATION, FRACTURE, TROCHANTERIC, HIP, USING PINS OR RODS          History reviewed. No pertinent past medical history.   History reviewed. No pertinent surgical history.   Allergies   Allergen Reactions    Aspirin Anaphylaxis      Social History     Tobacco Use    Smoking status: Former     Types: Cigarettes    Smokeless tobacco: Never   Substance Use Topics    Alcohol use: Yes     Comment: occ      Wt Readings from Last 1 Encounters:   25 93.2 kg (205 lb 7.5 oz)        Anesthesia Evaluation            ROS/MED HX  ENT/Pulmonary:     (+)                tobacco use, Past use,                       Neurologic:  - neg neurologic ROS     Cardiovascular:  - neg cardiovascular ROS     METS/Exercise Tolerance:     Hematologic:     (+) History of blood clots,    pt is not anticoagulated,           Musculoskeletal:   (+)     fracture, Fracture location: RLE,         GI/Hepatic:  - neg GI/hepatic ROS     Renal/Genitourinary:  - neg Renal ROS     Endo:  - neg endo ROS     Psychiatric/Substance Use:  - neg psychiatric ROS     Infectious Disease:  - neg infectious disease ROS     Malignancy:  - neg malignancy ROS     Other:            Physical Exam    Airway  airway exam normal      Mallampati: II   TM distance: > 3 FB   Neck ROM: full   Mouth opening: > 3 cm    Respiratory Devices and Support         Dental           Cardiovascular   cardiovascular exam normal       Rhythm and rate: regular and normal     Pulmonary   pulmonary exam normal        breath sounds clear to auscultation           OUTSIDE LABS:  CBC:   Lab Results   Component Value Date    WBC 7.8 2025    WBC 7.8 2021    HGB 12.9 2025    HGB 13.5 2021    HCT 37.9 2025    HCT 41.8 2021     2025     2021     BMP:   Lab Results   Component Value Date      "04/12/2025     09/20/2021    POTASSIUM 4.0 04/12/2025    POTASSIUM 3.7 09/20/2021    CHLORIDE 104 04/12/2025    CHLORIDE 108 09/20/2021    CO2 26 04/12/2025    CO2 26 09/20/2021    BUN 8.7 04/12/2025    BUN 13 09/20/2021    CR 0.67 04/12/2025    CR 0.62 09/20/2021     (H) 04/12/2025     (H) 09/20/2021     COAGS: No results found for: \"PTT\", \"INR\", \"FIBR\"  POC: No results found for: \"BGM\", \"HCG\", \"HCGS\"  HEPATIC:   Lab Results   Component Value Date    ALBUMIN 3.2 (L) 09/16/2021    PROTTOTAL 7.5 09/16/2021    ALT 41 09/16/2021    AST 40 09/16/2021    ALKPHOS 39 (L) 09/16/2021    BILITOTAL 0.9 09/16/2021     OTHER:   Lab Results   Component Value Date    LACT 1.8 09/16/2021    TANYA 8.6 (L) 04/12/2025    PHOS 3.5 09/18/2021    MAG 2.8 (H) 09/18/2021    CRP 11.1 (H) 09/20/2021       Anesthesia Plan    ASA Status:  2, emergent    NPO Status:  NPO Appropriate    Anesthesia Type: General (pt states she is having too much pain to tolerate spinal placement.).     - Airway: ETT   Induction: Intravenous, Propofol.   Maintenance: Balanced.        Consents    Anesthesia Plan(s) and associated risks, benefits, and realistic alternatives discussed. Questions answered and patient/representative(s) expressed understanding.     - Discussed: Risks, Benefits and Alternatives for BOTH SEDATION and the PROCEDURE were discussed     - Discussed with:  Patient       Use of blood products discussed: No .     Postoperative Care    Pain management: IV analgesics, Oral pain medications, Peripheral nerve block (Single Shot).   PONV prophylaxis: Ondansetron (or other 5HT-3), Dexamethasone or Solumedrol, Background Propofol Infusion     Comments:    Other Comments: The risks and benefits of anesthesia, and the alternatives where applicable, have been discussed with the patient, and they wish to proceed.              JOSÉ Langley CRNA    Clinically Significant Risk Factors Present on Admission                # Drug Induced " "Coagulation Defect: home medication list includes an anticoagulant medication              # Obesity: Estimated body mass index is 33.16 kg/m  as calculated from the following:    Height as of this encounter: 1.676 m (5' 6\").    Weight as of this encounter: 93.2 kg (205 lb 7.5 oz).                "

## 2025-04-12 NOTE — ANESTHESIA CARE TRANSFER NOTE
Patient: Sabina Kulkarni Sujey    Procedure: Procedure(s):  INTERNAL FIXATION, FRACTURE, TROCHANTERIC, HIP, USING PINS OR RODS       Diagnosis: Closed displaced intertrochanteric fracture of right femur, initial encounter (H) [S72.141A]  Diagnosis Additional Information: No value filed.    Anesthesia Type:   General     Note:    Oropharynx: oropharynx clear of all foreign objects and spontaneously breathing  Level of Consciousness: drowsy  Oxygen Supplementation: face mask    Independent Airway: airway patency satisfactory and stable  Dentition: dentition unchanged  Vital Signs Stable: post-procedure vital signs reviewed and stable  Report to RN Given: handoff report given  Patient transferred to: PACU    Handoff Report: Identifed the Patient, Identified the Reponsible Provider, Reviewed the pertinent medical history, Discussed the surgical course, Reviewed Intra-OP anesthesia mangement and issues during anesthesia, Set expectations for post-procedure period and Allowed opportunity for questions and acknowledgement of understanding      Vitals:  Vitals Value Taken Time   /62 04/12/25 1033   Temp     Pulse 76 04/12/25 1033   Resp     SpO2 89 % 04/12/25 1036   Vitals shown include unfiled device data.    Electronically Signed By: JOSÉ Langley CRNA  April 12, 2025  10:38 AM

## 2025-04-12 NOTE — PROGRESS NOTES
S-(situation): OT orders received and reviewed.    B-(background): Patient is a 69 y/o female with no relevant PMH, who sustained a GLF resulting in a R femur fx.     A-(assessment): Unable to be seen this date secondary to surgical operation scheduled.    R-(recommendations): OT plan to evaluate 4/14    Chriss Roman OTR/L

## 2025-04-12 NOTE — H&P
HOSPITALIST TELEMED ADMISSION H&P    Service Date : 4/11/2025  IDr. Gonzalez, am located in [place].   Sabina Rm is located in [place] at [facility].  The RN on the floor is assisting me today with this patient.    chief complaint: right hip pain after a GLF    History of Present Illness:  Sabina Rm is a 70 year old female, without significant PMH, apart from COVID PNA with respiratory failure in pandemic, on no Rx medications, who sustained accidental fall earlier at home and presented with displaced intertrochanteric Rt femur fracture.  She felt as usual today, wasn't lightheaded. She has no history of prior injury-falls. Denies SOB, chest pressure, fever, dysuria.    Emergency Room Course - pain managed with morphine, CBC, BMP WNR, stable vital signs.  Placed in observation for ORIF in AM.    Past Medical History  History reviewed. No pertinent past medical history.   Patient Active Problem List   Diagnosis    COVID-19    Closed displaced intertrochanteric fracture of right femur, initial encounter (H)    Osteopenia of multiple sites         Past Surgical History  - conization of cervix  - axillary mass excision    Social History  Sabina  reports that she has quit smoking. Her smoking use included cigarettes. She has never used smokeless tobacco. She reports current alcohol use.    Family History  Father - HTN, alcoholism  Mother - DM    Home Medications  Her medication list showing dexamethasone and Eliquis - not taking either one    Allergies  Allergies   Allergen Reactions    Aspirin Anaphylaxis        10 pt ROS neg except as noted in HPI    Physical Exam:  I performed all aspects of the physical examination via Telemedicine associated with two way audio and video communication.    Vital Signs: Blood pressure (!) 169/89, pulse 70, temperature 98.1  F (36.7  C), temperature source Oral, resp. rate 18, weight 95.3 kg (210 lb), SpO2 97%.    Physical Exam    Gen: in no acute  "distress, lying in hospital stretcher  HEENT:  normocephalic, atraumatic  Resp:  CTA b/l  Card:  RRR  Abd:  Soft per RN exam, no TTP, non-distended, obese  Musc:  tender right hip, w/o shortening or rotation, w/o swelling  Psych:  Good insight, oriented to person, place and time, not anxious, not agitated    DATA  Labs:  I have personally reviewed the following studies:  No results for input(s): \"POTASSIUM\", \"CHLORIDE\", \"CO2\", \"BUN\", \"CREATININE\", \"GLUCOSE\", \"PHOS\", \"ALBUMIN\", \"ALKPHOS\", \"AST\", \"ALT\", \"LIPASE\", \"AMYLASE\", \"PTT\", \"INR\" in the last 168 hours.    Invalid input(s): \"SODIUM\", \"CAPTH\", \"ALEX\", \"MAGNESIUM\", \"BILIRUBIN\", \"LACTA\" No results for input(s): \"WBC\", \"HGB\", \"HCT\", \"PLT\", \"BAND\", \"BNP\", \"MYOGLOBIN\", \"CRP\", \"TSH\" in the last 168 hours.    Invalid input(s): \"ESR\", \"CORTDX\", \"MMB\", \"RAPDTR\", \"CPK\", \"URICACID\", \"NH3\", \"SERUMOSMLLTY\", \"KYLE\", \"UOSM\"    Imaging: ER imaging reviewed displaced Rt intertrochanteric femur fracture.  - EKG - NSR 66    ASSESSMENT/PLAN:     Displaced, Rt intertrochanteric femur fracture  - NPO after midnight  - ORIF in AM  - pain management  - PT, OT,  consult      Osteopenia  - Vit D level pending        Clinically Significant Risk Factors Present on Admission                # Drug Induced Coagulation Defect: home medication list includes an anticoagulant medication       - she does not take Eliquis                   Misc  DVT prophylaxis: SCDs - ASA can't be used for postop DVT prophylaxis  - allergic - anaphylaxis  Code Status: Full code     The plan was discussed with - Patient  Time: 30 min   "

## 2025-04-12 NOTE — CONSULTS
ORTHOPEDIC HOSPITAL CONSULT    Chief Complaint: Sabina Kulkarni Sujey is a 70 year old female who is being seen for hip fracture at the request of Dr Cordon    History of Present Illness:   70-year-old female admitted through the ER last night after sustaining ground-level fall.  Tripped on a cat.  Typically ambulates independently.  Lives independent.  No pre-existing hip issues.  Prefall was feeling well.  No sickness.  Pain is isolated to the hip.  Nonradiating.  Currently evaluated on second floor.  Daughter at the bedside.      Patient's past medical, surgical, social and family histories reviewed.     History reviewed. No pertinent past medical history.    History reviewed. No pertinent surgical history.    Medications:  Current Facility-Administered Medications   Medication Dose Route Frequency Provider Last Rate Last Admin    acetaminophen (TYLENOL) tablet 650 mg  650 mg Oral Q4H PRN Gian Maier MD        Or    acetaminophen (TYLENOL) Suppository 650 mg  650 mg Rectal Q4H PRN Gian Maeir MD        bisacodyl (DULCOLAX) suppository 10 mg  10 mg Rectal Daily PRN Gian Maier MD        HYDROmorphone (DILAUDID) injection 1 mg  1 mg Intravenous Q2H PRN Gian Maier MD   1 mg at 04/12/25 0830    HYDROmorphone (PF) (DILAUDID) injection 0.5 mg  0.5 mg Intravenous Q2H PRN Kelley Balbuena CNP        lactated ringers infusion   Intravenous Continuous Gian Maier  mL/hr at 04/12/25 0827 New Bag at 04/12/25 0827    naloxone (NARCAN) injection 0.2 mg  0.2 mg Intravenous Q2 Min PRN Gian Maier MD        Or    naloxone (NARCAN) injection 0.4 mg  0.4 mg Intravenous Q2 Min PRN Gian Maier MD        Or    naloxone (NARCAN) injection 0.2 mg  0.2 mg Intramuscular Q2 Min PRN Gian Maier MD        Or    naloxone (NARCAN) injection 0.4 mg  0.4 mg Intramuscular Q2 Min PRN Devora Gonzalez  "MD Gian        ondansetron (ZOFRAN ODT) ODT tab 4 mg  4 mg Oral Q6H PRN Gian Maier MD        Or    ondansetron (ZOFRAN) injection 4 mg  4 mg Intravenous Q6H PRN Gian Maier MD        prochlorperazine (COMPAZINE) injection 5 mg  5 mg Intravenous Q6H PRN Gian Maier MD        Or    prochlorperazine (COMPAZINE) tablet 5 mg  5 mg Oral Q6H PRN Gian Maier MD           Allergies   Allergen Reactions    Aspirin Anaphylaxis       Social History     Occupational History    Not on file   Tobacco Use    Smoking status: Former     Types: Cigarettes    Smokeless tobacco: Never   Substance and Sexual Activity    Alcohol use: Yes     Comment: occ    Drug use: Not on file    Sexual activity: Not on file       History reviewed. No pertinent family history.    REVIEW OF SYSTEMS  General: negative for, night sweats, dizziness, fatigue  Resp: No shortness of breath and no cough  CV: negative for chest pain, syncope or near-syncope  GI: negative for nausea, vomiting and diarrhea  : negative for dysuria and hematuria  Musculoskeletal: as above  Neurologic: negative for syncope   Hematologic: negative for bleeding disorder    Physical Exam:  Vitals: BP (!) 155/81 (BP Location: Left arm)   Pulse 77   Temp 99.2  F (37.3  C) (Oral)   Resp 18   Ht 1.676 m (5' 6\")   Wt 93.2 kg (205 lb 7.5 oz)   SpO2 96%   BMI 33.16 kg/m    BMI= Body mass index is 33.16 kg/m .  Constitutional: healthy, alert and no acute distress   Psychiatric: mentation appears normal and affect normal/bright  NEURO: no focal deficits  SKIN: The overlying skin is intact.  There is no breakdown ecchymosis.  JOINT/EXTREMITIES:right hip: No gross deformity.  Held in a slightly externally rotated posture.  Range of motion not tested.  No pain with palpation with the compartments being soft compressible along the hip and lower leg.  Distal neurovascular intact.  No pain throughout the knee into the lower " extremity.  There is no knee effusion.  2+ dorsalis pedis pulse.  Sensation is intact to light touch    Bilateral upper extremities full active range of motion with no pain.  Cervical spine near full motion actively with no pain  GAIT: not tested     Diagnostic Modalities:  Right hip x-rays: Minimally displaced right intertrochanteric hip fracture.  Independent visualization of the images was performed.      Impression: right hip minimally displaced intertrochanteric fracture and a 70-year-old female independent ambulator    Plan:  The above was reviewed with Sabina and her daughter.    We discussed the injury.  Mechanical fall sustaining a right intertrochanteric hip fracture.  Based on her age and activity level we discussed surgical fixation in form of cephalomedullary nail fixation.  The risk, benefits, complications discussed risk including bleeding, infection, swelling, blood clots, fracture cut out, implant related issues reviewed.  We also discussed timeframe for recovery.  Once carefully reviewed with the patient and the patient's daughter she is opted to proceed with surgery.    History and physicals been reviewed she is NPO.  Case was reviewed with anesthesia.        Nile Oseguera D.O.

## 2025-04-12 NOTE — PROGRESS NOTES
"Formerly [facility]    Medicine Progress Note - Hospitalist Service    Date of Admission:  4/11/2025    Assessment & Plan   70 year old female who presents after a fall at home.  She reportedly stepped backwards and fell sideways onto her right hip resulting in significant pain and inability to tolerate with her weight.  She denied hitting her head feeling dizzy, having chest pain chest pain, shortness of breath, cough, abdominal pain or any numbness or tingling of her extremities.  She does not take any medications there is noted to be Eliquis in her medical history she does not take these medications that she is aware of.  She otherwise healthy and has no other acute complaints.     Displaced, Rt intertrochanteric femur fracture  - NPO -plan surgery for repair this am  - continue pain management  - PT, OT    Patient with no significant past medical history.  EKG NSR.  Non smoker.   Patient revised Cardiac Risk Index is 0 points.  3.9% risk of major cardiac event.    Patient may proceed to surgery.    Class 1 obesity.  Follow outpatient        Osteopenia  - Vit D level pending       Observation Goals: Nurse to notify provider when observation goals have been met and patient is ready for discharge.  Diet: NPO for Medical/Clinical Reasons Except for: No Exceptions    DVT Prophylaxis: Pneumatic Compression Devices  Spivey Catheter: Not present  Lines: None     Cardiac Monitoring: None  Code Status: Full Code      Clinically Significant Risk Factors Present on Admission                # Drug Induced Coagulation Defect: home medication list includes an anticoagulant medication              # Obesity: Estimated body mass index is 33.16 kg/m  as calculated from the following:    Height as of this encounter: 1.676 m (5' 6\").    Weight as of this encounter: 93.2 kg (205 lb 7.5 oz).              Social Drivers of Health    Housing Stability: High Risk (4/11/2025)    Housing Stability     Do you have " housing? : No     Are you worried about losing your housing?: No   Tobacco Use: Medium Risk (4/11/2025)    Patient History     Smoking Tobacco Use: Former     Smokeless Tobacco Use: Never   Alcohol Use: Alcohol Misuse (7/11/2023)    Received from [facility]     AUDIT-C     Frequency of Alcohol Consumption: 2-4 times a month     Average Number of Drinks: 5 or 6     Frequency of Binge Drinking: Never          Disposition Plan     Medically Ready for Discharge: Anticipated in 2-4 Days           The patient's care was discussed with the  entire care team .    Kelley Balbuena CNP  Hospitalist Service  [facility]  Securely message with [employer] (more info)  Text page via [employer] Paging/Directory   ______________________________________________________________________    Interval History   Patient admitted last evening.    Physical Exam   Vital Signs: Temp: 99.2  F (37.3  C) Temp src: Oral BP: (!) 155/81 Pulse: 77   Resp: 18 SpO2: 96 % O2 Device: None (Room air)    Weight: 205 lbs 7.5 oz    Gen:  Lying in bed no acute distress  HEENT:  normocephalic, atraumatic, oropharynx clear  Resp:  CTA  Card:  S1,S2, RRR no murmur, rub or gallop  Abd:  Soft, non tender,  normoactive bowel sounds   Neuro:  Neuro exam non focal      Medical Decision Making       40 MINUTES SPENT BY ME on the date of service doing chart review, history, exam, documentation & further activities per the note.        Data     I have personally reviewed the following data over the past 24 hrs:    7.8  \   12.9   / 170     137 104 8.7 /  106 (H)   4.0 26 0.67 \       Imaging results reviewed over the past 24 hrs:   Recent Results (from the past 24 hours)   XR Pelvis w Hip Right G/E 2 Views    Narrative    EXAM: XR PELVIS AND HIP RIGHT 2 VIEWS  LOCATION: [facility]  DATE: 4/11/2025    INDICATION: fall on lateral hip  COMPARISON: None.      Impression    IMPRESSION: Acute intertrochanteric  fracture of the proximal right femur with minimal displacement. There is normal joint alignment. Mild degenerative changes throughout the pelvis. Moderate to severe degenerative changes of the lower lumbar spine.   Osteopenia.

## 2025-04-12 NOTE — PROGRESS NOTES
Patient vital signs are at baseline: Yes  Patient able to ambulate as they were prior to admission or with assist devices provided by therapies during their stay:  No,  Reason:  scheduled for Rt. Hip surgery this morning.   Patient MUST void prior to discharge:  Yes  Patient able to tolerate oral intake:  No,  Reason:  Using IV medication for pain control - preoperatively   Pain has adequate pain control using Oral analgesics:  No,  Reason:  IV pain medication at this time  Does patient have an identified :  No,  Reason:  discharge POC pending surgery  Has goal D/C date and time been discussed with patient:  No,  Reason:  pending surgery this AM

## 2025-04-12 NOTE — OR NURSING
Transfer from pacu to Room 255  Transferred to bed via Hover mat (Glyder Mat,Transfer Boar,Slider Sheet)      S: 71 y/o female S/P internal fixation right hip- gamma nails  Anesthesia Type: general  Surgeon: Dr. rosenthal  Allergies: See Medication Reconciliation Record    B: Pertinent Medical History:   History reviewed. No pertinent past medical history.    Surgical History:   History reviewed. No pertinent surgical history.      A: EBL: 50  IVF: LR 100ml/hr  UOP: 900 at end of surgery  NPO: ___Yes _x__No   Vomiting: ___Yes _x__No   Drainage: no  Skin Integrity: normal, aqualcels to right hip (Normal; Pressure Ulcer (Location)  Pain: 7/10- treated with oxycodone.   See PACU record for ongoing assessment, vital signs and pain assessment.    RFO (Retained Foreign Object) ___Yes___No (identify item if present)   Brace/sling/equipment: ___Yes___No (identify item if present)    Report Given to:  Higinio    R: Post-Op vitals and assessments as ordered/indicated per patient's condition.  Follow Post-Op orders and notify Physician prn.  Continue to involve patient/family in plan of care and discharge planning.  Reinforce Pre-Operative education.  Implement skin safety interventions as appropriate.

## 2025-04-12 NOTE — PROGRESS NOTES
Right hip IT fracture.  Plan OR tomorrow morning, will confirm OR in am.  NPO after MN.      Nile Oseguera D.O.

## 2025-04-12 NOTE — ANESTHESIA POSTPROCEDURE EVALUATION
Patient: Sabina Kulkarni Sujey    Procedure: Procedure(s):  INTERNAL FIXATION, FRACTURE, TROCHANTERIC, HIP, USING PINS OR RODS       Anesthesia Type:  General    Note:  Disposition: Inpatient   Postop Pain Control: Uneventful            Sign Out: Well controlled pain   PONV: No   Neuro/Psych: Uneventful            Sign Out: Acceptable/Baseline neuro status   Airway/Respiratory: Uneventful            Sign Out: Acceptable/Baseline resp. status   CV/Hemodynamics: Uneventful            Sign Out: Acceptable CV status   Other NRE: NONE   DID A NON-ROUTINE EVENT OCCUR? No    Event details/Postop Comments:  Pt was happy with anesthesia care.  No complications.  I will follow up with the pt if needed.           Last vitals:  Vitals Value Taken Time   /69 04/12/25 1100   Temp 96.8  F (36  C) 04/12/25 1045   Pulse 84 04/12/25 1103   Resp     SpO2 93 % 04/12/25 1103   Vitals shown include unfiled device data.    Electronically Signed By: JOSÉ Langley CRNA  April 12, 2025  11:03 AM

## 2025-04-13 LAB
ANION GAP SERPL CALCULATED.3IONS-SCNC: 9 MMOL/L (ref 7–15)
ATRIAL RATE - MUSE: 66 BPM
BUN SERPL-MCNC: 7.1 MG/DL (ref 8–23)
CALCIUM SERPL-MCNC: 8.9 MG/DL (ref 8.8–10.4)
CHLORIDE SERPL-SCNC: 105 MMOL/L (ref 98–107)
CREAT SERPL-MCNC: 0.66 MG/DL (ref 0.51–0.95)
DIASTOLIC BLOOD PRESSURE - MUSE: NORMAL MMHG
EGFRCR SERPLBLD CKD-EPI 2021: >90 ML/MIN/1.73M2
ERYTHROCYTE [DISTWIDTH] IN BLOOD BY AUTOMATED COUNT: 13 % (ref 10–15)
EST. AVERAGE GLUCOSE BLD GHB EST-MCNC: 120 MG/DL
GLUCOSE SERPL-MCNC: 111 MG/DL (ref 70–99)
HBA1C MFR BLD: 5.8 %
HCO3 SERPL-SCNC: 25 MMOL/L (ref 22–29)
HCT VFR BLD AUTO: 37 % (ref 35–47)
HGB BLD-MCNC: 12.5 G/DL (ref 11.7–15.7)
INTERPRETATION ECG - MUSE: NORMAL
MCH RBC QN AUTO: 30.2 PG (ref 26.5–33)
MCHC RBC AUTO-ENTMCNC: 33.8 G/DL (ref 31.5–36.5)
MCV RBC AUTO: 89 FL (ref 78–100)
P AXIS - MUSE: 50 DEGREES
PLATELET # BLD AUTO: 149 10E3/UL (ref 150–450)
POTASSIUM SERPL-SCNC: 4.1 MMOL/L (ref 3.4–5.3)
PR INTERVAL - MUSE: 170 MS
QRS DURATION - MUSE: 86 MS
QT - MUSE: 392 MS
QTC - MUSE: 410 MS
R AXIS - MUSE: 15 DEGREES
RBC # BLD AUTO: 4.14 10E6/UL (ref 3.8–5.2)
SODIUM SERPL-SCNC: 139 MMOL/L (ref 135–145)
SYSTOLIC BLOOD PRESSURE - MUSE: NORMAL MMHG
T AXIS - MUSE: 51 DEGREES
VENTRICULAR RATE- MUSE: 66 BPM
WBC # BLD AUTO: 10.7 10E3/UL (ref 4–11)

## 2025-04-13 PROCEDURE — 250N000013 HC RX MED GY IP 250 OP 250 PS 637: Performed by: NURSE PRACTITIONER

## 2025-04-13 PROCEDURE — 120N000001 HC R&B MED SURG/OB

## 2025-04-13 PROCEDURE — 36415 COLL VENOUS BLD VENIPUNCTURE: CPT | Performed by: NURSE PRACTITIONER

## 2025-04-13 PROCEDURE — 250N000011 HC RX IP 250 OP 636: Mod: JZ | Performed by: NURSE PRACTITIONER

## 2025-04-13 PROCEDURE — 80048 BASIC METABOLIC PNL TOTAL CA: CPT | Performed by: NURSE PRACTITIONER

## 2025-04-13 PROCEDURE — 83036 HEMOGLOBIN GLYCOSYLATED A1C: CPT | Performed by: NURSE PRACTITIONER

## 2025-04-13 PROCEDURE — 99232 SBSQ HOSP IP/OBS MODERATE 35: CPT | Performed by: NURSE PRACTITIONER

## 2025-04-13 PROCEDURE — 85018 HEMOGLOBIN: CPT | Performed by: NURSE PRACTITIONER

## 2025-04-13 RX ORDER — NALOXONE HYDROCHLORIDE 0.4 MG/ML
0.2 INJECTION, SOLUTION INTRAMUSCULAR; INTRAVENOUS; SUBCUTANEOUS
Status: DISCONTINUED | OUTPATIENT
Start: 2025-04-13 | End: 2025-04-16 | Stop reason: HOSPADM

## 2025-04-13 RX ORDER — PANTOPRAZOLE SODIUM 40 MG/1
1 TABLET, DELAYED RELEASE ORAL DAILY PRN
COMMUNITY
Start: 2024-11-07

## 2025-04-13 RX ORDER — BUPIVACAINE HYDROCHLORIDE 2.5 MG/ML
INJECTION, SOLUTION EPIDURAL; INFILTRATION; INTRACAUDAL; PERINEURAL PRN
Status: DISCONTINUED | OUTPATIENT
Start: 2025-04-12 | End: 2025-04-13

## 2025-04-13 RX ORDER — ATORVASTATIN CALCIUM 80 MG/1
80 TABLET, FILM COATED ORAL DAILY
COMMUNITY
Start: 2024-11-07

## 2025-04-13 RX ORDER — METFORMIN HYDROCHLORIDE 500 MG/1
500 TABLET, EXTENDED RELEASE ORAL
Status: DISCONTINUED | OUTPATIENT
Start: 2025-04-13 | End: 2025-04-16 | Stop reason: HOSPADM

## 2025-04-13 RX ORDER — NALOXONE HYDROCHLORIDE 0.4 MG/ML
0.4 INJECTION, SOLUTION INTRAMUSCULAR; INTRAVENOUS; SUBCUTANEOUS
Status: DISCONTINUED | OUTPATIENT
Start: 2025-04-13 | End: 2025-04-16 | Stop reason: HOSPADM

## 2025-04-13 RX ORDER — ATORVASTATIN CALCIUM 40 MG/1
80 TABLET, FILM COATED ORAL DAILY
Status: DISCONTINUED | OUTPATIENT
Start: 2025-04-13 | End: 2025-04-16 | Stop reason: HOSPADM

## 2025-04-13 RX ORDER — METHOCARBAMOL 500 MG/1
250 TABLET ORAL 4 TIMES DAILY PRN
Status: DISCONTINUED | OUTPATIENT
Start: 2025-04-13 | End: 2025-04-13

## 2025-04-13 RX ORDER — TIZANIDINE 2 MG/1
2 TABLET ORAL EVERY 8 HOURS PRN
Status: DISCONTINUED | OUTPATIENT
Start: 2025-04-13 | End: 2025-04-16 | Stop reason: HOSPADM

## 2025-04-13 RX ORDER — PANTOPRAZOLE SODIUM 40 MG/1
40 TABLET, DELAYED RELEASE ORAL DAILY
Status: DISCONTINUED | OUTPATIENT
Start: 2025-04-13 | End: 2025-04-16 | Stop reason: HOSPADM

## 2025-04-13 RX ORDER — METFORMIN HYDROCHLORIDE 500 MG/1
500 TABLET, EXTENDED RELEASE ORAL
COMMUNITY
Start: 2024-11-07

## 2025-04-13 RX ADMIN — HYDROXYZINE HYDROCHLORIDE 10 MG: 10 TABLET ORAL at 13:34

## 2025-04-13 RX ADMIN — PANTOPRAZOLE SODIUM 40 MG: 40 TABLET, DELAYED RELEASE ORAL at 17:27

## 2025-04-13 RX ADMIN — FAMOTIDINE 20 MG: 20 TABLET, FILM COATED ORAL at 09:54

## 2025-04-13 RX ADMIN — METFORMIN ER 500 MG 500 MG: 500 TABLET ORAL at 17:27

## 2025-04-13 RX ADMIN — OXYCODONE HYDROCHLORIDE 5 MG: 5 TABLET ORAL at 19:37

## 2025-04-13 RX ADMIN — SENNOSIDES AND DOCUSATE SODIUM 1 TABLET: 50; 8.6 TABLET ORAL at 21:35

## 2025-04-13 RX ADMIN — HYDROMORPHONE HYDROCHLORIDE 0.4 MG: 0.2 INJECTION, SOLUTION INTRAMUSCULAR; INTRAVENOUS; SUBCUTANEOUS at 05:16

## 2025-04-13 RX ADMIN — APIXABAN 2.5 MG: 2.5 TABLET, FILM COATED ORAL at 21:35

## 2025-04-13 RX ADMIN — CEFAZOLIN SODIUM 2 G: 2 SOLUTION INTRAVENOUS at 01:05

## 2025-04-13 RX ADMIN — ACETAMINOPHEN 650 MG: 325 TABLET, FILM COATED ORAL at 19:37

## 2025-04-13 RX ADMIN — TIZANIDINE 2 MG: 2 TABLET ORAL at 17:27

## 2025-04-13 RX ADMIN — HYDROXYZINE HYDROCHLORIDE 10 MG: 10 TABLET ORAL at 06:25

## 2025-04-13 RX ADMIN — APIXABAN 2.5 MG: 2.5 TABLET, FILM COATED ORAL at 09:54

## 2025-04-13 RX ADMIN — ACETAMINOPHEN 650 MG: 325 TABLET, FILM COATED ORAL at 15:20

## 2025-04-13 RX ADMIN — ACETAMINOPHEN 650 MG: 325 TABLET, FILM COATED ORAL at 11:01

## 2025-04-13 RX ADMIN — OXYCODONE HYDROCHLORIDE 5 MG: 5 TABLET ORAL at 11:01

## 2025-04-13 RX ADMIN — TIZANIDINE 2 MG: 2 TABLET ORAL at 09:54

## 2025-04-13 RX ADMIN — POLYETHYLENE GLYCOL 3350 17 G: 17 POWDER, FOR SOLUTION ORAL at 09:54

## 2025-04-13 RX ADMIN — OXYCODONE HYDROCHLORIDE 5 MG: 5 TABLET ORAL at 15:20

## 2025-04-13 RX ADMIN — HYDROXYZINE HYDROCHLORIDE 10 MG: 10 TABLET ORAL at 19:37

## 2025-04-13 RX ADMIN — OXYCODONE HYDROCHLORIDE 5 MG: 5 TABLET ORAL at 06:25

## 2025-04-13 RX ADMIN — ATORVASTATIN CALCIUM 80 MG: 40 TABLET, FILM COATED ORAL at 17:27

## 2025-04-13 ASSESSMENT — ACTIVITIES OF DAILY LIVING (ADL)
ADLS_ACUITY_SCORE: 33
ADLS_ACUITY_SCORE: 33
ADLS_ACUITY_SCORE: 29
ADLS_ACUITY_SCORE: 32
ADLS_ACUITY_SCORE: 29
ADLS_ACUITY_SCORE: 33
DEPENDENT_IADLS:: INDEPENDENT
ADLS_ACUITY_SCORE: 32
ADLS_ACUITY_SCORE: 33
ADLS_ACUITY_SCORE: 29
ADLS_ACUITY_SCORE: 33
ADLS_ACUITY_SCORE: 29

## 2025-04-13 NOTE — PLAN OF CARE
Goal Outcome Evaluation:      Plan of Care Reviewed With: patient    Overall Patient Progress: improvingOverall Patient Progress: improving    Outcome Evaluation: Pt A&Ox4. Lethargic upon arrival back to M/S floor, more alert as the day progressed. Rates pain 7/10 most times. 0.4 mg IV dilaudid given x1, as well as PO tylenol and 5 mg oxycodone each given x1. Attempted ambulation to bedside commode, pt claims movement was too painful, therefore bedpan utilitzed. Voided 850 mL. LR continuously infusing at 100 mL/hr. IV ancef q8h. Tolerated clear liquids this afternoon. Refused dinner tray and did not want anything else ordered. Dressings to R hip CDI. Daughter remains at bedside.

## 2025-04-13 NOTE — PLAN OF CARE
Goal Outcome Evaluation: Progressing      Pt continues to complain of significant pain with movement, pain management encouraged for activity tolerance, Pt refused to get up out of bed to use the commode, did not tolerate turning to use the bed pan for voiding, External catheter placed for voiding with good urine output. Oxycodone given once and Pt has since been able to rest.  Pt refused to keep continuous pulse Ox or capnography in place after midnight this shift.   Oxygen saturations are stable on 2 LPM nasal canula.   CMS remains in tact throughout.   Surgical dressing is CDI.   Pt is tolerating adequate oral fluid intake.

## 2025-04-13 NOTE — PROGRESS NOTES
"Former[facility]    Medicine Progress Note - Hospitalist Service    Date of Admission:  4/11/2025    Assessment & Plan   70 year old female who presents after a fall at home.  She reportedly stepped backwards and fell sideways onto her right hip resulting in significant pain and inability to tolerate with her weight.  She denied hitting her head feeling dizzy, having chest pain chest pain, shortness of breath, cough, abdominal pain or any numbness or tingling of her extremities.  She does not take any medications there is noted to be Eliquis in her medical history she does not take these medications that she is aware of however we later found out per discussion with her and review of family physician records that she does have prediabetes, dyslipidemia and GERD.  She otherwise healthy and has no other acute complaints.      Displaced, Rt intertrochanteric femur fracture   POD #1 repair rt femur  -plan per Orthopedics  - increase activity and pain management (has been having significant discomfort but now up in a chair this afternoon)  - PT in am    GERD continue home PPI    Prediabetes per clinic records.  Recently started on Metformin - continue    dyslipidemia.  Continue atorvastin 80 mg daily     Class 1 obesity.  Following outpatient      Osteopenia  - Vit D level pending             Diet: Regular Diet Adult Thin Liquids (level 0)    DVT Prophylaxis: DOAC  Spivey Catheter: Not present  Lines: None     Cardiac Monitoring: None  Code Status: Full Code      Clinically Significant Risk Factors                              # Obesity: Estimated body mass index is 33.16 kg/m  as calculated from the following:    Height as of this encounter: 1.676 m (5' 6\").    Weight as of this encounter: 93.2 kg (205 lb 7.5 oz)., PRESENT ON ADMISSION            Social Drivers of Health    Housing Stability: High Risk (4/11/2025)    Housing Stability     Do you have housing? : No     Are you worried about " losing your housing?: No   Tobacco Use: Medium Risk (4/11/2025)    Patient History     Smoking Tobacco Use: Former     Smokeless Tobacco Use: Never   Alcohol Use: Alcohol Misuse (7/11/2023)    Received from [facility]     AUDIT-C     Frequency of Alcohol Consumption: 2-4 times a month     Average Number of Drinks: 5 or 6     Frequency of Binge Drinking: Never          Disposition Plan     Medically Ready for Discharge: Anticipated Tomorrow           The patient's care was discussed with the  entire care team .    Kelley Balbuena CNP  Hospitalist Service  [facility]  Securely message with [employer] (more info)  Text page via [facility] Paging/Directory   ______________________________________________________________________    Interval History   Having difficulty with pain control overnight    Physical Exam   Vital Signs: Temp: 98.6  F (37  C) Temp src: Oral BP: 131/55 Pulse: 81   Resp: 16 SpO2: 95 % O2 Device: Nasal cannula Oxygen Delivery: 1 LPM  Weight: 205 lbs 7.5 oz    Gen:  Lying in bed no acute distress  HEENT:  normocephalic, atraumatic, oropharynx clear  Resp:  CTA  Card:  S1,S2, RRR no murmur, rub or gallop  Ext:  right hip incision covered, +cms bilateral le  Abd:  Soft nondistended, non tender,  normoactive bowel sounds   Neuro:  Neuro exam non focal      Medical Decision Making       40 MINUTES SPENT BY ME on the date of service doing chart review, history, exam, documentation & further activities per the note.        Data     I have personally reviewed the following data over the past 24 hrs:    10.7  \   12.5   / 149 (L)     139 105 7.1 (L) /  111 (H)   4.1 25 0.66 \       Imaging results reviewed over the past 24 hrs:   No results found for this or any previous visit (from the past 24 hours).

## 2025-04-13 NOTE — ANESTHESIA PROCEDURE NOTES
Fascia iliaca Procedure Note    Pre-Procedure   Staff -        Performed By: CRNA       Location: post-op       Pre-Anesthestic Checklist: patient identified, IV checked, site marked, risks and benefits discussed, informed consent, monitors and equipment checked, pre-op evaluation, at physician/surgeon's request and post-op pain management  Timeout:       Correct Patient: Yes        Correct Procedure: Yes        Correct Site: Yes        Correct Position: Yes        Correct Laterality: Yes        Site Marked: Yes  Procedure Documentation  Procedure: Fascia iliaca         Laterality: right       Patient Position: supine       Patient Prep/Sterile Barriers: sterile gloves, mask       Skin prep: Chloraprep       Local skin infiltrated with 3 mL of 2% lidocaine. lower extremity plane block       Needle Type: insulated       Needle Gauge: 22.        Needle Length (millimeters): 100        Ultrasound guided       1. Ultrasound was used to identify targeted nerve, plexus, vascular marker, or fascial plane and place a needle adjacent to it in real-time.       2. Ultrasound was used to visualize the spread of anesthetic in close proximity to the above referenced structure.       3. A permanent image is entered into the patient's record.    Assessment/Narrative         The placement was negative for: blood aspirated, painful injection and site bleeding       Paresthesias: No.       Test dose of mL at.         Test dose negative, 3 minutes after injection, for signs of intravascular, subdural, or intrathecal injection.       Bolus given via needle..        Secured via.        Insertion/Infusion Method: Single Shot       Complications: none       Injection made incrementally with aspirations every 5 mL.     Comments:  Placed with the assistance of an RN.  No HR increase with injections and no other complications noted.  I will follow up with the pt if needed.      FOR [facility] ([facility]) ONLY:   Pain Team Contact information:  "please page the Pain Team Via [facility]. Search \"Pain\". During daytime hours, please page the attending first. At night please page the resident first.      "

## 2025-04-13 NOTE — PLAN OF CARE
Goal Outcome Evaluation: [id number]      Plan of Care Reviewed With: patient    Overall Patient Progress: improvingOverall Patient Progress: improving    Outcome Evaluation: The patient is A&Ox4, VSS, right thigh pain was mild at rest this am and overnight, but with staff slightly moving the foot, the patient would scream out in pain saying it is very sharp in the thigh. Ortho PA Santy was notified and assessed the patient, added in Zanaflex. After patient recieved Tylenol, Oxycodone, Zanaflex, and Atarax we were finally able to assist her to the BSC and chair. Will continue medication regimen, monitor VS and encourage ambulation. PT planned for tomorrow.

## 2025-04-13 NOTE — PROGRESS NOTES
Pt continues to complain of pain with activity but no pain at rest. Pt refuses to try to get up to the commode, bedpan placed with no output, Pt states she can not empty her bladder, bladder scan showed 999. Will straight cath.   Oxycodone, atarax, and dilaudid given for pain this morning.

## 2025-04-13 NOTE — CONSULTS
Care Management Initial Consult    General Information  Assessment completed with: Patient, Children, Patient and daughter- Roopa  Type of CM/SW Visit: Initial Assessment    Primary Care Provider verified and updated as needed: Yes   Readmission within the last 30 days: no previous admission in last 30 days      Reason for Consult: discharge planning  Advance Care Planning: Advance Care Planning Reviewed: no concerns identified          Communication Assessment  Patient's communication style: spoken language (English or Bilingual)    Hearing Difficulty or Deaf: no   Wear Glasses or Blind: yes (for driving)    Cognitive  Cognitive/Neuro/Behavioral: .WDL except  Level of Consciousness: alert  Arousal Level: opens eyes spontaneously  Orientation: oriented x 4  Mood/Behavior: calm, cooperative  Best Language: 0 - No aphasia  Speech: clear    Living Environment:   People in home: alone, other (see comments) (Daughter plans to stay with patient during initial recovery)     Current living Arrangements: house      Able to return to prior arrangements: yes       Family/Social Support:  Care provided by: self  Provides care for: no one  Marital Status: Single  Support system: Children          Description of Support System: Supportive, Involved    Support Assessment: Adequate family and caregiver support, Adequate social supports    Current Resources:   Patient receiving home care services: No        Community Resources: None  Equipment currently used at home: none  Supplies currently used at home:      Employment/Financial:  Employment Status: retired        Financial Concerns:             Does the patient's insurance plan have a 3 day qualifying hospital stay waiver?  No    Lifestyle & Psychosocial Needs:  Social Drivers of Health     Food Insecurity: Low Risk  (4/11/2025)    Food Insecurity     Within the past 12 months, did you worry that your food would run out before you got money to buy more?: No     Within the past 12  months, did the food you bought just not last and you didn t have money to get more?: No   Depression: Not at risk (10/17/2024)    Received from [facility]     PHQ-2     PHQ2 Total: 0   Housing Stability: High Risk (4/11/2025)    Housing Stability     Do you have housing? : No     Are you worried about losing your housing?: No   Tobacco Use: Medium Risk (4/11/2025)    Patient History     Smoking Tobacco Use: Former     Smokeless Tobacco Use: Never     Passive Exposure: Not on file   Financial Resource Strain: Low Risk  (4/11/2025)    Financial Resource Strain     Within the past 12 months, have you or your family members you live with been unable to get utilities (heat, electricity) when it was really needed?: No   Alcohol Use: Alcohol Misuse (7/11/2023)    Received from [facility]     AUDIT-C     Frequency of Alcohol Consumption: 2-4 times a month     Average Number of Drinks: 5 or 6     Frequency of Binge Drinking: Never   Transportation Needs: Low Risk  (4/11/2025)    Transportation Needs     Within the past 12 months, has lack of transportation kept you from medical appointments, getting your medicines, non-medical meetings or appointments, work, or from getting things that you need?: No   Physical Activity: Not on file   Interpersonal Safety: Low Risk  (4/12/2025)    Interpersonal Safety     Do you feel physically and emotionally safe where you currently live?: Yes     Within the past 12 months, have you been hit, slapped, kicked or otherwise physically hurt by someone?: No     Within the past 12 months, have you been humiliated or emotionally abused in other ways by your partner or ex-partner?: No   Stress: Not on file   Social Connections: Not on file   Health Literacy: Not on file       Functional Status:  Prior to admission patient needed assistance:   Dependent ADLs:: Independent  Dependent IADLs:: Independent  Assesssment of Functional Status: Not at  functional baseline, Not at  baseline with mobility    Mental Health Status:  Mental Health Status: No Current Concerns       Chemical Dependency Status:  Chemical Dependency Status: No Current Concerns             Values/Beliefs:  Spiritual, Cultural Beliefs, Scientology Practices, Values that affect care: no               Discussed  Partnership in Safe Discharge Planning  document with patient/family: No    Additional Information:  Care Management consulted for discharge planning.    Writer visited with patient and her daughter, Roopa.  Writer introduced self and role of Care Management.  Reviewed the information above.  Patient reported that at baseline, she is independent with all ADLs and IADLs.      Discussed PT eval planned for tomorrow and will await recommendations, but patient anticipating she will go home with home care PT.  Patient has no home care preference, but stated she does not want [facility].  List of Medicare certified home care agencies provided.  Patient reported that she is not interested in going to a TCU, if that were recommended.  Daughter, Roopa, stated that she plans to stay with patient at her home during her initial recovery.  Daughter also plans to transport patient home.  Writer verified patient's home address in [place].      Next Steps: Care Management will continue to follow and assist with discharge planning.  Will await PT recommendations before referral to home care.      PEGGY Morrison  [facility]   [phone]

## 2025-04-13 NOTE — ADDENDUM NOTE
Addendum  created 04/13/25 0834 by Dejon Bauer APRN CRNA    Child order released for a procedure order, Clinical Note Signed, Intraprocedure Blocks edited, Intraprocedure Meds edited, SmartForm saved

## 2025-04-13 NOTE — PROGRESS NOTES
"[facility]  Orthopedics Progress Note           Assessment and Plan:    Assessment:   Post-operative day #1 Procedure(s):  INTERNAL FIXATION, FRACTURE, TROCHANTERIC, HIP, USING PINS OR RODS           Plan:   Encourage IS  Start or continue physical therapy  Activity as tolerated  Weight-bear as tolerated.  Discharge pending progress  Pain control measures  Advance diet as tolerated  Routine wound care  DVT Prophylaxis: SCD's, Compression Hose, Eliquis  Hospitalist following and assisting in medical issues.            Interval History:   Per patient no pain at rest. When she attempts to move the leg she reports severe, 10/10 pain.  Has not been out of bed.  Refusing to get out of bed.  No fall or new injury since surgery. Hemoglobin WDL. No numbness/tingling.  Has not worked with therapy.  Required straight cath last night.    Has oxycodone, muscle relaxer, tylenol, ordered.  Tolerating oral intake.  Spoke with patient at length regarding expectations, need and importance to move.  Attempted with nursing to get patient out of bed.  She refused, as soon as attempting to move the leg and even prior to moving the leg she was saying severe pain and refusing.   Unsuccessful in getting patient to transfer. Thigh is soft non-tender.  As soon as stopped attempt to move pain resolved.   Discussed in chair for meals, commode.  Will have nursing attempt to ambulate patient later again.            Review of Systems:    The patient denies any chest pain, shortness of breath, excessive pain, fever, chills, purulent drainage from the wound, nausea or vomiting.               Physical Exam:   General: awake, alert, appropriate and in no acute distress  Blood pressure 136/60, pulse 77, temperature 98.5  F (36.9  C), temperature source Oral, resp. rate 16, height 1.676 m (5' 6\"), weight 93.2 kg (205 lb 7.5 oz), SpO2 94%.  Right hip:  Aquacell dressings x 3 are all clean, dry and intact without shadowing. Surrounding " skin intact, no breakdown. Compartments soft and non-tender. No pain with palpation to the thigh, knee, or hip. Calf is soft, nontender with no significant swelling. Distal neurovascular grossly intact including 2+ distal pulse, sensation intact to foot, able to df/pf against resistance. Brisk cap refill.            Data:     Results for orders placed or performed during the hospital encounter of 04/11/25 (from the past 24 hours)   XR Surgery SATNAM L/T 5 Min Fluoro w Stills    Narrative    This exam was marked as non-reportable because it will not be read by a   radiologist or a [place] non-radiologist provider.         CBC with platelets   Result Value Ref Range    WBC Count 10.7 4.0 - 11.0 10e3/uL    RBC Count 4.14 3.80 - 5.20 10e6/uL    Hemoglobin 12.5 11.7 - 15.7 g/dL    Hematocrit 37.0 35.0 - 47.0 %    MCV 89 78 - 100 fL    MCH 30.2 26.5 - 33.0 pg    MCHC 33.8 31.5 - 36.5 g/dL    RDW 13.0 10.0 - 15.0 %    Platelet Count 149 (L) 150 - 450 10e3/uL   Basic metabolic panel   Result Value Ref Range    Sodium 139 135 - 145 mmol/L    Potassium 4.1 3.4 - 5.3 mmol/L    Chloride 105 98 - 107 mmol/L    Carbon Dioxide (CO2) 25 22 - 29 mmol/L    Anion Gap 9 7 - 15 mmol/L    Urea Nitrogen 7.1 (L) 8.0 - 23.0 mg/dL    Creatinine 0.66 0.51 - 0.95 mg/dL    GFR Estimate >90 >60 mL/min/1.73m2    Calcium 8.9 8.8 - 10.4 mg/dL    Glucose 111 (H) 70 - 99 mg/dL     Updated Dr. Oumar Xie, APRN, CNP  Orthopedic Surgery

## 2025-04-14 ENCOUNTER — APPOINTMENT (OUTPATIENT)
Dept: PHYSICAL THERAPY | Facility: CLINIC | Age: 70
DRG: 481 | End: 2025-04-14
Attending: NURSE PRACTITIONER
Payer: COMMERCIAL

## 2025-04-14 ENCOUNTER — APPOINTMENT (OUTPATIENT)
Dept: OCCUPATIONAL THERAPY | Facility: CLINIC | Age: 70
DRG: 481 | End: 2025-04-14
Attending: NURSE PRACTITIONER
Payer: COMMERCIAL

## 2025-04-14 ENCOUNTER — APPOINTMENT (OUTPATIENT)
Dept: GENERAL RADIOLOGY | Facility: CLINIC | Age: 70
End: 2025-04-14
Attending: NURSE PRACTITIONER
Payer: COMMERCIAL

## 2025-04-14 LAB
GLUCOSE BLDC GLUCOMTR-MCNC: 92 MG/DL (ref 70–99)
HGB BLD-MCNC: 12.7 G/DL (ref 11.7–15.7)
HOLD SPECIMEN: NORMAL

## 2025-04-14 PROCEDURE — 97110 THERAPEUTIC EXERCISES: CPT | Mod: GP | Performed by: PHYSICAL THERAPIST

## 2025-04-14 PROCEDURE — 250N000013 HC RX MED GY IP 250 OP 250 PS 637: Performed by: NURSE PRACTITIONER

## 2025-04-14 PROCEDURE — 97162 PT EVAL MOD COMPLEX 30 MIN: CPT | Mod: GP | Performed by: PHYSICAL THERAPIST

## 2025-04-14 PROCEDURE — 73502 X-RAY EXAM HIP UNI 2-3 VIEWS: CPT

## 2025-04-14 PROCEDURE — 85018 HEMOGLOBIN: CPT | Performed by: NURSE PRACTITIONER

## 2025-04-14 PROCEDURE — 97165 OT EVAL LOW COMPLEX 30 MIN: CPT | Mod: GO

## 2025-04-14 PROCEDURE — 97530 THERAPEUTIC ACTIVITIES: CPT | Mod: GP | Performed by: PHYSICAL THERAPIST

## 2025-04-14 PROCEDURE — 36415 COLL VENOUS BLD VENIPUNCTURE: CPT | Performed by: NURSE PRACTITIONER

## 2025-04-14 PROCEDURE — 120N000001 HC R&B MED SURG/OB

## 2025-04-14 PROCEDURE — 99232 SBSQ HOSP IP/OBS MODERATE 35: CPT | Performed by: NURSE PRACTITIONER

## 2025-04-14 RX ORDER — OXYCODONE HYDROCHLORIDE 5 MG/1
10 TABLET ORAL EVERY 4 HOURS PRN
Status: DISCONTINUED | OUTPATIENT
Start: 2025-04-14 | End: 2025-04-16 | Stop reason: HOSPADM

## 2025-04-14 RX ORDER — ZINC OXIDE 15 MG
1 TABLET,DISINTEGRATING ORAL DAILY
COMMUNITY

## 2025-04-14 RX ORDER — BISACODYL 10 MG
10 SUPPOSITORY, RECTAL RECTAL ONCE
Status: COMPLETED | OUTPATIENT
Start: 2025-04-14 | End: 2025-04-14

## 2025-04-14 RX ORDER — AMOXICILLIN 250 MG
2 CAPSULE ORAL 2 TIMES DAILY
Status: DISCONTINUED | OUTPATIENT
Start: 2025-04-14 | End: 2025-04-16 | Stop reason: HOSPADM

## 2025-04-14 RX ORDER — OXYCODONE HYDROCHLORIDE 5 MG/1
5 TABLET ORAL EVERY 4 HOURS PRN
Status: DISCONTINUED | OUTPATIENT
Start: 2025-04-14 | End: 2025-04-16 | Stop reason: HOSPADM

## 2025-04-14 RX ADMIN — SENNOSIDES AND DOCUSATE SODIUM 1 TABLET: 50; 8.6 TABLET ORAL at 08:52

## 2025-04-14 RX ADMIN — ACETAMINOPHEN 650 MG: 325 TABLET, FILM COATED ORAL at 05:47

## 2025-04-14 RX ADMIN — OXYCODONE HYDROCHLORIDE 5 MG: 5 TABLET ORAL at 10:10

## 2025-04-14 RX ADMIN — ACETAMINOPHEN 650 MG: 325 TABLET, FILM COATED ORAL at 17:23

## 2025-04-14 RX ADMIN — ATORVASTATIN CALCIUM 80 MG: 40 TABLET, FILM COATED ORAL at 08:52

## 2025-04-14 RX ADMIN — OXYCODONE HYDROCHLORIDE 10 MG: 5 TABLET ORAL at 19:06

## 2025-04-14 RX ADMIN — APIXABAN 2.5 MG: 2.5 TABLET, FILM COATED ORAL at 08:52

## 2025-04-14 RX ADMIN — ACETAMINOPHEN 650 MG: 325 TABLET, FILM COATED ORAL at 00:50

## 2025-04-14 RX ADMIN — SENNOSIDES AND DOCUSATE SODIUM 2 TABLET: 50; 8.6 TABLET ORAL at 19:54

## 2025-04-14 RX ADMIN — METFORMIN ER 500 MG 500 MG: 500 TABLET ORAL at 17:23

## 2025-04-14 RX ADMIN — PANTOPRAZOLE SODIUM 40 MG: 40 TABLET, DELAYED RELEASE ORAL at 08:52

## 2025-04-14 RX ADMIN — APIXABAN 2.5 MG: 2.5 TABLET, FILM COATED ORAL at 19:54

## 2025-04-14 RX ADMIN — HYDROXYZINE HYDROCHLORIDE 10 MG: 10 TABLET ORAL at 05:47

## 2025-04-14 RX ADMIN — OXYCODONE HYDROCHLORIDE 10 MG: 5 TABLET ORAL at 15:03

## 2025-04-14 RX ADMIN — BISACODYL 10 MG: 10 SUPPOSITORY RECTAL at 13:04

## 2025-04-14 RX ADMIN — ACETAMINOPHEN 650 MG: 325 TABLET, FILM COATED ORAL at 13:15

## 2025-04-14 RX ADMIN — OXYCODONE HYDROCHLORIDE 5 MG: 5 TABLET ORAL at 00:50

## 2025-04-14 RX ADMIN — POLYETHYLENE GLYCOL 3350 17 G: 17 POWDER, FOR SOLUTION ORAL at 08:52

## 2025-04-14 RX ADMIN — OXYCODONE HYDROCHLORIDE 5 MG: 5 TABLET ORAL at 05:47

## 2025-04-14 RX ADMIN — TIZANIDINE 2 MG: 2 TABLET ORAL at 05:47

## 2025-04-14 ASSESSMENT — ACTIVITIES OF DAILY LIVING (ADL)
ADLS_ACUITY_SCORE: 32
PREVIOUS_RESPONSIBILITIES: MEAL PREP;HOUSEKEEPING;LAUNDRY;SHOPPING;YARDWORK;MEDICATION MANAGEMENT;FINANCES;DRIVING
ADLS_ACUITY_SCORE: 28
ADLS_ACUITY_SCORE: 30
ADLS_ACUITY_SCORE: 32
ADLS_ACUITY_SCORE: 28
ADLS_ACUITY_SCORE: 32
ADLS_ACUITY_SCORE: 32
ADLS_ACUITY_SCORE: 30
ADLS_ACUITY_SCORE: 32
ADLS_ACUITY_SCORE: 28
ADLS_ACUITY_SCORE: 32
ADLS_ACUITY_SCORE: 28
ADLS_ACUITY_SCORE: 32

## 2025-04-14 NOTE — PROGRESS NOTES
Received call from physical therapy. Continues to have pain and difficulty with movement, transfers.    Will get repeat xray for the right hip, also increased oxycodone from 2.5 to 5 to 5 to 10 mg      JOSÉ Douglas, CNP  Orthopedic Surgery

## 2025-04-14 NOTE — PLAN OF CARE
Goal Outcome Evaluation:      Plan of Care Reviewed With: patient    Overall Patient Progress: no changeOverall Patient Progress: no change    Outcome Evaluation: Pt A/Ox4. VSS on RA. Continued pain to RLE, noting 8-10/10 in severity. Prn Oxycodone, Tylenol, Tizanidine and atarax given with some relief. Up to bedside commode x2, with increase in pain. Pt still having a hard time moving right leg. Incision is C/D/I. CMS intact.

## 2025-04-14 NOTE — PROGRESS NOTES
"Care Management Follow Up    Length of Stay (days): 2    Expected Discharge Date: 04/15/2025     Concerns to be Addressed: discharge planning       Patient plan of care discussed at interdisciplinary rounds: Yes    Anticipated Discharge Disposition: Home, Home Care vs TCU      Anticipated Discharge Services: Home Care VS TCU     Anticipated Discharge DME: Other (see comment) (TBD)    Patient/family educated on Medicare website which has current facility and service quality ratings: yes    Education Provided on the Discharge Plan:      Patient/Family in Agreement with the Plan: yes    Referrals Placed by CM/SW:  None yet- patient is undecided about her discharge plan of TCU vs home with home care.      Private pay costs discussed: Not applicable    Discussed  Partnership in Safe Discharge Planning  document with patient/family: No     Handoff Completed: No, handoff not indicated or clinically appropriate    Additional Information:  Patient continues to have pain and x-ray was ordered.      Hospital PT services recommends TCU placement at this time.  Writer provided patient with a list of area TCU's during 0930 morning rounds.  Writer went back into the room around 11:50 a.m. Patient stated she has been busy with therapies and has not made a decision about a discharge plan or had time to look at the list.  Discussed that writer will check back with her later today.      Next Steps: Await patient to make a decision regarding her discharge plan (TCU vs home with home care).  Then CM to send referrals based on patient preferences.      1507- Writer checked in with patient. She is leaning toward a TCU plan, but wants to talk about this when her daughter is back to the hospital at 4pm today.  Writer will talk with them at 4pm.  Patient currently has a friend in the room visiting.     1620- Writer visited with patient at 4pm.  Daughter had not arrived.  Patient stated that she will need to go to TCU, \"to make it easier on " "my daughter.\"  Per patient request, referrals were sent to the following TCU facilities:    [facility] Pending - Request Sent -- [address] [address] [phone]    Current Capacity last updated by Raul Starkey, RN on 9/1/2023  3:01 PM    Has secure memory unit            [facility] Pending - Request Sent -- [address] [id number] [phone] --   Current Capacity last updated by Dolly Guan MA on 4/8/2025 10:43 AM    *Facility does not seem to respond            [facility] Pending - Request Sent -- [address] [address] [phone] --     [place] [facility] is patient's very first choice, as daughter, Roopa, lives in [place].      CM to follow up on referrals tomorrow.      PEGGY Morrison  [facility]   [phone]      "

## 2025-04-14 NOTE — PROGRESS NOTES
"[facility]    Medicine Progress Note - Hospitalist Service    Date of Admission:  4/11/2025    Assessment & Plan   70 year old female who presents after a fall at home.  She reportedly stepped backwards and fell sideways onto her right hip resulting in significant pain and inability to tolerate with her weight.  She denied hitting her head feeling dizzy, having chest pain chest pain, shortness of breath, cough, abdominal pain or any numbness or tingling of her extremities.  She does not take any medications there is noted to be Eliquis in her medical history she does not take these medications that she is aware of however we later found out per discussion with her and review of family physician records that she does have prediabetes, dyslipidemia and GERD.     Displaced, Rt intertrochanteric femur fracture   POD #2 repair rt femur  -plan per Orthopedics  - increase activity and pain management (pain management has been an issue with activity.  PT saw and recommend TCU.  Patient thinking out this.  Otherwise Orthopedics has increased analgesia regimen and repeat imaging.      Drug induced constipation.  States no BM since Friday.    -increase bowel regimen with frequent opioid dosing     GERD continue home PPI     Prediabetes per clinic records.  Recently started on Metformin - continue     dyslipidemia.  Continue atorvastin 80 mg daily     Class 1 obesity.  Following outpatient      Osteopenia  - Vit D level pending          Diet: Regular Diet Adult Thin Liquids (level 0)    DVT Prophylaxis: DOAC  Spivey Catheter: Not present  Lines: None     Cardiac Monitoring: None  Code Status: Full Code      Clinically Significant Risk Factors                              # Obesity: Estimated body mass index is 33.16 kg/m  as calculated from the following:    Height as of this encounter: 1.676 m (5' 6\").    Weight as of this encounter: 93.2 kg (205 lb 7.5 oz)., PRESENT ON ADMISSION        "     Social Drivers of Health    Housing Stability: High Risk (4/11/2025)    Housing Stability     Do you have housing? : No     Are you worried about losing your housing?: No   Tobacco Use: Medium Risk (4/11/2025)    Patient History     Smoking Tobacco Use: Former     Smokeless Tobacco Use: Never   Alcohol Use: Alcohol Misuse (7/11/2023)    Received from [facility]     AUDIT-C     Frequency of Alcohol Consumption: 2-4 times a month     Average Number of Drinks: 5 or 6     Frequency of Binge Drinking: Never          Disposition Plan     Medically Ready for Discharge: Anticipated Tomorrow           The patient's care was discussed with the  entire care team .    Kelley Balbuena CNP  Hospitalist Service  [facility]  Securely message with [employer] (more info)  Text page via [employer] Paging/Directory   ______________________________________________________________________    Interval History   Extreme discomfort with any movement but no pain at rest, otherwise no complaints    Physical Exam   Vital Signs: Temp: 98.4  F (36.9  C) Temp src: Oral BP: 129/55 Pulse: 68   Resp: 20 SpO2: 95 % O2 Device: None (Room air)    Weight: 205 lbs 7.5 oz    Gen:  sitting in chair no acute distress  HEENT:  normocephalic, atraumatic, oropharynx clear  Resp:  CTA  Card:  S1,S2, RRR no murmur, rub or gallop  Abd:  Soft nondistended, non tender,  normoactive bowel sounds   Neuro:  Neuro exam non focal      Medical Decision Making       40 MINUTES SPENT BY ME on the date of service doing chart review, history, exam, documentation & further activities per the note.        Data     I have personally reviewed the following data over the past 24 hrs:    N/A  \   12.7   / N/A     N/A N/A N/A /  92   N/A N/A N/A \     TSH: N/A T4: N/A A1C: N/A       Imaging results reviewed over the past 24 hrs:   No results found for this or any previous visit (from the past 24 hours).

## 2025-04-14 NOTE — PLAN OF CARE
Goal Outcome Evaluation: [id number]      Plan of Care Reviewed With: patient    Overall Patient Progress: improvingOverall Patient Progress: improving    Outcome Evaluation: Up in the chair once today, will need encouragement this evening. Voided on BSC at 1330, nothing since. Frequently giving meds, and icing incision.

## 2025-04-14 NOTE — PROGRESS NOTES
04/14/25 0715   Appointment Info   Signing Clinician's Name / Credentials (PT) Cinthia George PT, DPT, ATC, LAT   Rehab Comments (PT) PT eval and treat Status post hip surgery, Discharge recommendations and therapy to facilitate a safe discharge. 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 does not have to access upper level. elevator to access lower level of the home. main level living for patient, flat bed, walk in shower with threshold.   Self-Care   Usual Activity Tolerance good   Current Activity Tolerance fair   Regular Exercise No   Equipment Currently Used at Home none   Fall history within last six months yes   Number of times patient has fallen within last six months 1   General Information   Onset of Illness/Injury or Date of Surgery 04/12/25   Referring Physician Santy KUMAR, CNP   Patient/Family Therapy Goals Statement (PT) go home   Pertinent History of Current Problem (include personal factors and/or comorbidities that impact the POC) Patient is a 70 year old female, status post fall resulting in right intertrochanteric femur fracture, patient is POD 2 right hip ORIF by Dr. Oseguera. Patient with a previous medical history of osteopenia, GERD, dyslipidemia, obesity and prediabetes.   Existing Precautions/Restrictions fall   Weight-Bearing Status - LUE full weight-bearing   Weight-Bearing Status - RUE full weight-bearing   Weight-Bearing Status - LLE full weight-bearing   Weight-Bearing Status - RLE weight-bearing as tolerated   General Observations on room  air, IV saline locked   Cognition   Affect/Mental Status (Cognition) WFL   Orientation Status (Cognition) oriented x 4   Follows Commands (Cognition) WFL   Pain Assessment   Patient Currently in Pain Yes, see Vital Sign flowsheet  (at rest 0/10. with movement 10/10)   Integumentary/Edema   Integumentary/Edema Comments post op dressing CDI   Posture    Posture Forward head position;Protracted shoulders   Range of Motion (ROM)   ROM Comment right hip limited by severe pain, supine hip AROM 0-40*   Strength (Manual Muscle Testing)   Strength (Manual Muscle Testing) Able to perform L SLR;Deficits observed during functional mobility   Strength Comments weak quad strength, weak knee extension in short sitting. all tasks laborous due to severe pain   Bed Mobility   Bed Mobility supine-sit   Comment, (Bed Mobility) significant time required for bed mobility. Unable to complete with HOB flat as set up at home. declined physical assistance for transfer.   Transfers   Transfers sit-stand transfer;bed-chair transfer   Maintains Weight-bearing Status (Transfers) able to maintain   Transfer Safety Concerns Noted decreased step length;decreased weight-shifting ability   Impairments Contributing to Impaired Transfers pain;decreased strength   Bed-Chair Transfer   Assistive Device (Bed-Chair Transfers) walker, front-wheeled   Bed-Chair Cole (Transfers) contact guard;verbal cues   Sit-Stand Transfer   Sit-Stand Cole (Transfers) contact guard;verbal cues   Assistive Device (Sit-Stand Transfers) walker, front-wheeled   Gait/Stairs (Locomotion)   Comment, (Gait/Stairs) unable to progress due to severe pain   Balance   Balance Comments pain impacting sitting and transitional balance. High risk of falling due to severe pain and laborous mobilization   Sensory Examination   Sensory Perception patient reports no sensory changes   Coordination   Coordination Comments impaired by pain   Muscle Tone   Muscle Tone Comments  muscle spasms with movement   Clinical Impression   Criteria for Skilled Therapeutic Intervention Yes, treatment indicated   PT Diagnosis (PT) impaired mobility, muscle weakness, joint stiffness, impaired balance, unable to ambulate   Influenced by the following impairments post op hip fracture repair   Functional limitations due to impairments pain, assitance for transfers, unable to complete home environment bed mobility   Clinical Presentation (PT Evaluation Complexity) unstable   Clinical Presentation Rationale uncontrolled pain limiting mobilization. medical status. clinical judgement   Clinical Decision Making (Complexity) moderate complexity   Planned Therapy Interventions (PT) balance training;bed mobility training;cryotherapy;gait training;joint mobilization;ROM (range of motion);strengthening;patient/family education;transfer training;progressive activity/exercise;home program guidelines   Risk & Benefits of therapy have been explained evaluation/treatment results reviewed;participants voiced agreement with care plan;participants included;patient;daughter   Clinical Impression Comments Patient presents with functional mobility below her baseline. Patient's pain is a chief limitor in her ability to mobilize. At this time patient requires TCU placement in order to faciliate safe progression to home with family support while managing and training post op recovery.   PT Total Evaluation Time   PT Eval, Moderate Complexity Minutes (34097) 20   Physical Therapy Goals   PT Frequency Daily   PT Predicted Duration/Target Date for Goal Attainment 04/21/25   PT Goals Bed Mobility;Transfers;Gait   PT: Bed Mobility Independent;Supine to/from sit   PT: Transfers Modified independent;Sit to/from stand;Assistive device   PT: Gait Modified independent;Rolling walker;50 feet   PT Discharge Planning   PT Plan Daily. 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 No stairs and main level living. No DME at baseline. Patient presents with functional mobility below her baseline. Patient's pain is a chief limitor in her ability to mobilize. At this time patient requires TCU placement in order to faciliate safe progression to home with family support while managing and training post op recovery.   PT Brief overview of current status Unable to complete HOB flat supine to sitting EOB. CGA, VC and use of bed rails sitting up in bed to sitting EOB. CGA sit to/from stand with 2WW. CGA bed to chair transfer with 2WW. HEP poor tolerance. 10/10 pain with RLE use.   PT Total Distance Amb During Session (feet) 2   Physical Therapy Time and Intention   Total Session Time (sum of timed and untimed services) 20     Thank you for your referral.  Cinthia George, PT, DPT, ATC, LAT    [facility]  O: [phone]  E: [email or web address]

## 2025-04-14 NOTE — PROGRESS NOTES
"[facility]  Orthopedics Progress Note           Assessment and Plan:    Assessment:   Post-operative day #2 Procedure(s):  INTERNAL FIXATION, FRACTURE, TROCHANTERIC, HIP, USING PINS OR RODS           Plan:   Encourage IS  Start or continue physical therapy  Activity as tolerated  Weight-bear as tolerated.  Discharge pending progress  Pain control measures  Advance diet as tolerated  Routine wound care  DVT Prophylaxis: SCD's, Compression Hose, Eliquis  Hospitalist following and assisting in medical issues.            Interval History:   Continues to be comfortable at rest. Overall pain improved from yesterday. Was able to do some transfers and get out of bed to sit in chair and use beside commode. Has not done any walking yet. Will work with therapy yet.  Again discussed surgery, recovery, and will have some pain even with medications. Discussed importance of moving, walking and getting out of bed.  She does take care her quadriplegic son at baseline and is independent and active.            Review of Systems:    The patient denies any chest pain, shortness of breath, excessive pain, fever, chills, purulent drainage from the wound, nausea or vomiting.               Physical Exam:   General: awake, alert, appropriate and in no acute distress  Blood pressure 116/57, pulse 71, temperature 98.6  F (37  C), temperature source Oral, resp. rate 16, height 1.676 m (5' 6\"), weight 93.2 kg (205 lb 7.5 oz), SpO2 93%.  Right hip:  Aquacell dressings x 3 continue to be all clean, dry and intact without shadowing. Surrounding skin intact, no breakdown. Compartments soft and non-tender. No pain with palpation to the thigh, knee, or hip. Calf is soft, nontender with no significant swelling. Distal neurovascular grossly intact including 2+ distal pulse, sensation intact to foot, able to df/pf against resistance. Brisk cap refill.            Data:     Results for orders placed or performed during the hospital " encounter of 04/11/25 (from the past 24 hours)   Hemoglobin   Result Value Ref Range    Hemoglobin 12.7 11.7 - 15.7 g/dL   Extra Tube    Narrative    The following orders were created for panel order Extra Tube.  Procedure                               Abnormality         Status                     ---------                               -----------         ------                     Extra Green Top (Lithiu...[[id number]]                      In process                   Please view results for these tests on the individual orders.   Glucose by meter   Result Value Ref Range    GLUCOSE BY METER POCT 92 70 - 99 mg/dL     Discussed with Dr. Oumar Xie, APRN, CNP  Orthopedic Surgery

## 2025-04-14 NOTE — PROGRESS NOTES
"   04/14/25 1000   Appointment Info   Signing Clinician's Name / Credentials (OT) Jeffery Baer OTR/L   Living Environment   People in Home child(amanda), dependent  (Patient is son's PCA in the evenings and over night.)   Current Living Arrangements house   Home Accessibility no concerns;stairs within home   Number of Stairs, Within Home, Primary eight   Transportation Anticipated family or friend will provide   Living Environment Comments Patient reports her daughter plans to stay with her if she is cleared to return home. Patient reports she does not have to access upper level. Patient reports having an elevator to access lower level of the home. Patient reports main level living for with a flat bed and walk in shower with threshold.   Self-Care   Usual Activity Tolerance good   Current Activity Tolerance fair   Regular Exercise No   Equipment Currently Used at Home none   Fall history within last six months yes   Instrumental Activities of Daily Living (IADL)   Previous Responsibilities meal prep;housekeeping;laundry;shopping;yardwork;medication management;finances;driving   IADL Comments Patient reports being IND with all IADLs at baseline.   General Information   Onset of Illness/Injury or Date of Surgery 04/11/25   Referring Physician Gian Maier MD   Patient/Family Therapy Goal Statement (OT) To return home. Patient states she will think about a TCU placement.   Additional Occupational Profile Info/Pertinent History of Current Problem Per chart: \"Patient is a 70 year old female, status post fall resulting in right intertrochanteric femur fracture, patient is POD 2 right hip ORIF by Dr. Oseguera. Patient with a previous medical history of osteopenia, GERD, dyslipidemia, obesity and prediabetes.\"   Existing Precautions/Restrictions fall   Left Upper Extremity (Weight-bearing Status) full weight-bearing (FWB)   Right Upper Extremity (Weight-bearing Status) full weight-bearing (FWB)   Left Lower " "Extremity (Weight-bearing Status) full weight-bearing (FWB)   Right Lower Extremity (Weight-bearing Status) weight-bearing as tolerated (WBAT)   Cognitive Status Examination   Orientation Status orientation to person, place and time   Affect/Mental Status (Cognitive) WFL   Follows Commands WFL   Visual Perception   Visual Impairment/Limitations WFL   Sensory   Sensory Quick Adds sensation intact   Pain Assessment   Patient Currently in Pain Yes, see Vital Sign flowsheet   Posture   Posture forward head position;protracted shoulders   Range of Motion Comprehensive   General Range of Motion no range of motion deficits identified   Strength Comprehensive (MMT)   General Manual Muscle Testing (MMT) Assessment no strength deficits identified   Muscle Tone Assessment   Muscle Tone Quick Adds No deficits were identified   Coordination   Upper Extremity Coordination No deficits were identified   Bed Mobility   Comment (Bed Mobility) Patient was sitting in hospital room chair upon arrival.   Transfers   Transfers sit-stand transfer   Sit-Stand Transfer   Sit-Stand Donley (Transfers) contact guard   Assistive Device (Sit-Stand Transfers) walker, front-wheeled   Sit/Stand Transfer Comments Patient CGA with sit to stand transfer from sitting in hospital room chair to standing with the use of FWW. Patient reports significant amounts of pain with transfer. therapist had patient then return to sitting in hospital room chair.   Balance   Balance Assessment no deficits identified   Activities of Daily Living   BADL Assessment/Intervention grooming   Comment, BADL Assessment/Training Patient reports being IND with all ADLs at baseline. Patient reports to therapist that she is tired from PT and does not want to engage in any tasks that require her to get out of hospital room chair. She reports she does not want to engage in tasks at this time as she is still waiting for her pain medications to \"kick in\".   Additional " Documentation Comment, BADL Assessment/Training (Row)   Grooming Assessment/Training   Position (Grooming) supported sitting   Whick Level (Grooming) grooming skills;hair care, combing/brushing;wash face, hands;set up   Comment, (Grooming) Patient currently set up assist with grooming tasks while seated in hospital room chair.   Clinical Impression   Criteria for Skilled Therapeutic Interventions Met (OT) Yes, treatment indicated   OT Diagnosis Impaired ADLs and transfers due to significant amounts of pain from post op hip fracture repair.   OT Problem List-Impairments impacting ADL problems related to;activity tolerance impaired;strength;pain   ADL comments/analysis Patient reports being IND with all ADLs at baseline. Patient currently below baseline with ADLs due to significant amounts of pain from post op hip fracture repair.   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 Patient reports being IND with all ADLs and IADLs at baseline. Patient currently below baseline with ADLs due to significant amounts of pain from post op hip fracture repair. At this time OT recommending TCU placement to further improve patient's ADLs, transfers, and overall safety closer to baseline before returning home safely.   OT Total Evaluation Time   OT Eval, Low Complexity Minutes (18130) 25   OT Goals   Therapy Frequency (OT) 5 times/week   OT Predicted Duration/Target Date for Goal Attainment 04/21/25   OT Goals Lower  Body Dressing;Hygiene/Grooming;Bed Mobility;Transfers;Toilet Transfer/Toileting   OT: Hygiene/Grooming modified independent   OT: Lower Body Dressing Modified independent   OT: Bed Mobility Modified independent   OT: Transfer Modified independent   OT: Toilet Transfer/Toileting Modified independent   Interventions   Interventions Quick Adds Self-Care/Home Management   OT Discharge Planning   OT Plan 1/5: Mon - ADLs/transfers/safety   OT Discharge Recommendation (DC Rec) Transitional Care Facility   OT Rationale for DC Rec Patient from home with adult son, who she PCA's for. Patient reports being IND with all ADLs and IADLs at baseline. Patient currently below baseline with ADLs due to significant amounts of pain from post op hip fracture repair. At this time OT recommending TCU placement to further improve patient's ADLs, transfers, and overall safety closer to baseline before returning home safely.   OT Brief overview of current status Patient currently below baseline with ADLs.   OT Total Distance Amb During Session (feet) 0   Total Session Time   Total Session Time (sum of timed and untimed services) 25     Jeffery Baer OTR/L  Occupational Therapist

## 2025-04-14 NOTE — PLAN OF CARE
Goal Outcome Evaluation:      Plan of Care Reviewed With: patient    Overall Patient Progress: no changeOverall Patient Progress: no change    Outcome Evaluation: patient A&O, vss, RA, pain 10/10, oxy increased from 5mg to 10mg, pain 9/10, patient refuses to ambulate and only moves from bed to chair, encouraging excercises and standing, poor appetite (ensure given at breakfast), suppository given, small BM

## 2025-04-15 ENCOUNTER — APPOINTMENT (OUTPATIENT)
Dept: PHYSICAL THERAPY | Facility: CLINIC | Age: 70
DRG: 481 | End: 2025-04-15
Payer: COMMERCIAL

## 2025-04-15 PROCEDURE — 250N000013 HC RX MED GY IP 250 OP 250 PS 637: Performed by: NURSE PRACTITIONER

## 2025-04-15 PROCEDURE — 97530 THERAPEUTIC ACTIVITIES: CPT | Mod: GP | Performed by: PHYSICAL THERAPIST

## 2025-04-15 PROCEDURE — 99233 SBSQ HOSP IP/OBS HIGH 50: CPT | Performed by: NURSE PRACTITIONER

## 2025-04-15 PROCEDURE — 120N000001 HC R&B MED SURG/OB

## 2025-04-15 RX ORDER — OXYCODONE HYDROCHLORIDE 5 MG/1
5 TABLET ORAL EVERY 4 HOURS PRN
Qty: 30 TABLET | Refills: 0 | Status: SHIPPED | OUTPATIENT
Start: 2025-04-15

## 2025-04-15 RX ORDER — ACETAMINOPHEN 500 MG
1000 TABLET ORAL EVERY 8 HOURS PRN
Qty: 70 TABLET | Refills: 0 | Status: SHIPPED | OUTPATIENT
Start: 2025-04-15

## 2025-04-15 RX ORDER — TIZANIDINE 2 MG/1
2 TABLET ORAL EVERY 8 HOURS PRN
Qty: 45 TABLET | Refills: 1 | Status: SHIPPED | OUTPATIENT
Start: 2025-04-15

## 2025-04-15 RX ORDER — METHOCARBAMOL 500 MG/1
500 TABLET, FILM COATED ORAL 4 TIMES DAILY
Status: DISCONTINUED | OUTPATIENT
Start: 2025-04-15 | End: 2025-04-16 | Stop reason: HOSPADM

## 2025-04-15 RX ORDER — HYDROXYZINE HYDROCHLORIDE 10 MG/1
10 TABLET, FILM COATED ORAL EVERY 6 HOURS PRN
Qty: 45 TABLET | Refills: 0 | Status: SHIPPED | OUTPATIENT
Start: 2025-04-15

## 2025-04-15 RX ORDER — AMOXICILLIN 250 MG
2 CAPSULE ORAL 2 TIMES DAILY PRN
Qty: 45 TABLET | Refills: 0 | Status: SHIPPED | OUTPATIENT
Start: 2025-04-15

## 2025-04-15 RX ORDER — ACETAMINOPHEN 325 MG/1
650 TABLET ORAL EVERY 4 HOURS PRN
Status: DISCONTINUED | OUTPATIENT
Start: 2025-04-15 | End: 2025-04-16 | Stop reason: HOSPADM

## 2025-04-15 RX ORDER — ACETAMINOPHEN 325 MG/1
975 TABLET ORAL 3 TIMES DAILY
Status: DISCONTINUED | OUTPATIENT
Start: 2025-04-15 | End: 2025-04-16 | Stop reason: HOSPADM

## 2025-04-15 RX ADMIN — ATORVASTATIN CALCIUM 80 MG: 40 TABLET, FILM COATED ORAL at 09:03

## 2025-04-15 RX ADMIN — METFORMIN ER 500 MG 500 MG: 500 TABLET ORAL at 17:27

## 2025-04-15 RX ADMIN — OXYCODONE HYDROCHLORIDE 5 MG: 5 TABLET ORAL at 23:03

## 2025-04-15 RX ADMIN — ACETAMINOPHEN 975 MG: 325 TABLET, FILM COATED ORAL at 14:16

## 2025-04-15 RX ADMIN — SENNOSIDES AND DOCUSATE SODIUM 2 TABLET: 50; 8.6 TABLET ORAL at 09:03

## 2025-04-15 RX ADMIN — TIZANIDINE 2 MG: 2 TABLET ORAL at 10:24

## 2025-04-15 RX ADMIN — OXYCODONE HYDROCHLORIDE 10 MG: 5 TABLET ORAL at 07:43

## 2025-04-15 RX ADMIN — APIXABAN 2.5 MG: 2.5 TABLET, FILM COATED ORAL at 20:17

## 2025-04-15 RX ADMIN — PANTOPRAZOLE SODIUM 40 MG: 40 TABLET, DELAYED RELEASE ORAL at 09:03

## 2025-04-15 RX ADMIN — OXYCODONE HYDROCHLORIDE 10 MG: 5 TABLET ORAL at 03:22

## 2025-04-15 RX ADMIN — METHOCARBAMOL 500 MG: 500 TABLET, FILM COATED ORAL at 15:46

## 2025-04-15 RX ADMIN — HYDROXYZINE HYDROCHLORIDE 10 MG: 10 TABLET ORAL at 07:43

## 2025-04-15 RX ADMIN — ACETAMINOPHEN 650 MG: 325 TABLET, FILM COATED ORAL at 10:24

## 2025-04-15 RX ADMIN — POLYETHYLENE GLYCOL 3350 17 G: 17 POWDER, FOR SOLUTION ORAL at 09:03

## 2025-04-15 RX ADMIN — APIXABAN 2.5 MG: 2.5 TABLET, FILM COATED ORAL at 09:03

## 2025-04-15 RX ADMIN — ACETAMINOPHEN 975 MG: 325 TABLET, FILM COATED ORAL at 20:17

## 2025-04-15 RX ADMIN — METHOCARBAMOL 500 MG: 500 TABLET, FILM COATED ORAL at 20:17

## 2025-04-15 ASSESSMENT — ACTIVITIES OF DAILY LIVING (ADL)
ADLS_ACUITY_SCORE: 28

## 2025-04-15 NOTE — PROGRESS NOTES
"[facility]  Orthopedics Progress Note           Assessment and Plan:    Assessment:   Post-operative day #3 Procedure(s):  INTERNAL FIXATION, FRACTURE, TROCHANTERIC, HIP, USING PINS OR RODS           Plan:   Encourage IS  Start or continue physical therapy  Activity as tolerated  Weight-bear as tolerated.  Discharge pending progress  Pain control measures  Advance diet as tolerated  Routine wound care  DVT Prophylaxis: SCD's, Compression Hose, Eliquis  Hospitalist following and assisting in medical issues.            Interval History:   Continues to report no pain in bed and at rest and severe pain with any attempted movements. Has been declining and resistive to transfers, OOB, walking. Per patient due to pain.  She denies the leg feeling unsteady. Denies numbness/tingling. Pain is reported as anterior superior thigh. Non-radiating.  No fevers. Vitals WDL. Did work with therapy yesterday and ambulated to chair, bathroom, and back.  Xray was rechecked yesterday and reassuring.   Increased oxycodone to 5-10mg.  Currently on zanaflex, atarax, tylenol, oxycodone.  She reports the medications are effective. Tolerating oral intake. Voiding. Small bm reported yesterday.     Had discussion with patient regarding the importance of ambulation, the concerns about bedrest, the expectations of pain, and discussed the importance of therapy.    Social work consulted and looking at TCU options due to lack of progress with activity.              Review of Systems:    The patient denies any chest pain, shortness of breath, excessive pain, fever, chills, purulent drainage from the wound, nausea or vomiting.               Physical Exam:   General: awake, alert, appropriate and in no acute distress  Blood pressure 135/53, pulse 81, temperature 98.2  F (36.8  C), temperature source Oral, resp. rate 18, height 1.676 m (5' 6\"), weight 93.2 kg (205 lb 7.5 oz), SpO2 94%.  Right hip:  Aquacell dressings x 3 continue to be " all clean, dry and intact without shadowing. Surrounding skin intact, no breakdown. Compartments soft and non-tender. No pain with palpation to the thigh, knee, or hip. Gentle ROM of the knee in bed provided no pain. Declines straight leg raise due to pain. Passive straight leg caused anterior hip pain.  Calf is soft, nontender with no significant swelling. Distal neurovascular grossly intact including 2+ distal pulse, sensation intact to foot, able to df/pf against resistance. Brisk cap refill.            Data:     Results for orders placed or performed during the hospital encounter of 04/11/25 (from the past 24 hours)   XR Pelvis w Hip Right G/E 2 Views    Narrative    EXAM: XR PELVIS AND HIP RIGHT 2 VIEWS  LOCATION: [facility]  DATE: 4/14/2025    INDICATION: s p ORIF of the right hip. having pain  COMPARISON: 04/11/2025.      Impression    IMPRESSION:     Interval postoperative changes from ORIF of the right proximal femur fracture in good anatomic alignment. Hardware appears intact and well seated. Otherwise, unchanged     Discussed with Dr. Oumar Xie, APRN, CNP  Orthopedic Surgery

## 2025-04-15 NOTE — PLAN OF CARE
Goal Outcome Evaluation:      Plan of Care Reviewed With: patient    Overall Patient Progress: no change    Outcome Evaluation: 4 hour shift note. A&Ox4. pain rate 9/10, but declined any intervention even with tolerable rating as 7/10. ice packs in place. not OOB this 4 hour shift, encouraged walking in the howard, but was unsuccessful. call light in reach and can make needs known. CMS intact.

## 2025-04-15 NOTE — PROGRESS NOTES
"[facility]    Medicine Progress Note - Hospitalist Service    Date of Admission:  4/11/2025    Assessment & Plan   70 year old female who presents after a fall at home.  She reportedly stepped backwards and fell sideways onto her right hip resulting in significant pain and inability to tolerate with her weight.  She denied hitting her head feeling dizzy, having chest pain chest pain, shortness of breath, cough, abdominal pain or any numbness or tingling of her extremities.  She does not take any medications there is noted to be Eliquis in her medical history she does not take these medications that she is aware of however we later found out per discussion with her and review of family physician records that she does have prediabetes, dyslipidemia and GERD.     Displaced, Rt intertrochanteric femur fracture   POD #2 repair rt femur  -plan per Orthopedics  - To help with mobility tolerance, did adjust pain medication regimen with scheduled Tylenol and scheduled Robaxin  - Plan for TCU    Drug induced constipation.  States no BM since Friday.    -increase bowel regimen with frequent opioid dosing     GERD continue home PPI     Prediabetes per clinic records.  Recently started on Metformin - continue     dyslipidemia.  Continue atorvastin 80 mg daily     Class 1 obesity.  Following outpatient      Osteopenia  - Vit D level pending          Diet: Regular Diet Adult Thin Liquids (level 0)    DVT Prophylaxis: DOAC  Spivey Catheter: Not present  Lines: None     Cardiac Monitoring: None  Code Status: Full Code      Clinically Significant Risk Factors                              # Obesity: Estimated body mass index is 33.16 kg/m  as calculated from the following:    Height as of this encounter: 1.676 m (5' 6\").    Weight as of this encounter: 93.2 kg (205 lb 7.5 oz)., PRESENT ON ADMISSION            Social Drivers of Health    Housing Stability: High Risk (4/11/2025)    Housing Stability     Do " you have housing? : No     Are you worried about losing your housing?: No   Tobacco Use: Medium Risk (4/11/2025)    Patient History     Smoking Tobacco Use: Former     Smokeless Tobacco Use: Never   Alcohol Use: Alcohol Misuse (7/11/2023)    Received from [facility]     AUDIT-C     Frequency of Alcohol Consumption: 2-4 times a month     Average Number of Drinks: 5 or 6     Frequency of Binge Drinking: Never          Disposition Plan     Medically Ready for Discharge: Anticipated Tomorrow               Nikky Rosa,DNP APRN CNP  Hospitalist Service  [facility]  Securely message with [employer] (more info)  Text page via [facility] Paging/Directory   ______________________________________________________________________    Interval History   This morning patient is seen with her daughter at bedside.  No significant events overnight.  Patient states that she is improving today.  Pain management is better and mobility seems to be increasing.  Likely plan for transitional care unit for continued rehabilitation    Physical Exam   Vital Signs: Temp: 98.2  F (36.8  C) Temp src: Oral BP: 135/53 Pulse: 81   Resp: 18 SpO2: 94 % O2 Device: None (Room air)    Weight: 205 lbs 7.5 oz    Physical Exam  HENT:      Mouth/Throat:      Mouth: Mucous membranes are moist.   Cardiovascular:      Rate and Rhythm: Normal rate and regular rhythm.      Pulses: Normal pulses.   Pulmonary:      Effort: Pulmonary effort is normal.      Breath sounds: Normal breath sounds.   Skin:     General: Skin is warm and dry.   Neurological:      General: No focal deficit present.      Mental Status: She is alert.          Medical Decision Making       53 MINUTES SPENT BY ME on the date of service doing chart review, history, exam, documentation & further activities per the note.      Data         Imaging results reviewed over the past 24 hrs:   No results found for this or any previous visit (from the past 24 hours).

## 2025-04-15 NOTE — PROGRESS NOTES
Care Management Follow Up    Length of Stay (days): 3    Expected Discharge Date: 04/15/2025     Concerns to be Addressed: discharge planning     Patient plan of care discussed at interdisciplinary rounds: Yes    Anticipated Discharge Disposition: Transitional Care  Disposition Comments: [facility]  Anticipated Discharge Services: Transportation Services  Anticipated Discharge DME: None    Patient/family educated on Medicare website which has current facility and service quality ratings: yes  Education Provided on the Discharge Plan: Yes  Patient/Family in Agreement with the Plan: yes    Referrals Placed by CM/SW: External Care Coordination, Post Acute Facilities, Transportation  Private pay costs discussed: transportation costs    Discussed  Partnership in Safe Discharge Planning  document with patient/family: Yes: patient     Handoff Completed: Yes, non-[facility] PCP: External handoff communication completed    Additional Information:  Per NP at IDT rounds pt is medically stable for discharge today. Plan is for TCU at discharge.    CM received update from Joey in admissions at [facility] that he can accept patient for TCU cares pending insurance prior auth.     CM met bedside with patient to discuss, patient in agreement with plan for TCU at [place]. Per patient will need w/c ride at discharge, PT in agreement with this.    Update 3:18-Joey in admissions called, no prior auth yet from laura Cook to update CM in the morning.    Next Steps:   -Await insurance auth for TCU    Plan: [facility] (Main Phone: [phone] Admissions Phone: [phone] Fax: [phone])    Transport: wheelchair-private pay or elim bus if available    CONSTANTINE Pryor  Care Transitions Registered Nurse

## 2025-04-15 NOTE — PLAN OF CARE
Goal Outcome Evaluation:      Plan of Care Reviewed With: patient    Overall Patient Progress: no changeOverall Patient Progress: no change    Outcome Evaluation: A/OX4, able to make needs known. Voiding WNL. VSS, afebrile. Rated pain 10/10 to R hip this shift. PRN 10mg Oxycodone administered, per order. Encouraged pt to get up to toilet after allowing time for pain medication to take effect. Pt refused to ambulate to bathroom but used commode requiring much encouragement. Pt benefits from education on proper body positioning mechanics and alignment with ADLs. Dressing C/D/I. Ice applied intermittently. No BM this shift, per report pt had small BM in evening and received kasandra softener. All safety precautions in place.

## 2025-04-15 NOTE — PLAN OF CARE
Goal Outcome Evaluation:      Plan of Care Reviewed With: patient    Overall Patient Progress: improvingOverall Patient Progress: improving    Outcome Evaluation: patient A&O, PRN oxy, atarax, Tylenol, and zanaflex for pain, Tylenol and Robaxin now scheduled, ambulating to bathroom but not outside of room, dressing CDI, BM x1, poor appetite

## 2025-04-15 NOTE — MEDICATION SCRIBE - ADMISSION MEDICATION HISTORY
Medication Scribe Admission Medication History    Admission medication history is complete. The information provided in this note is only as accurate as the sources available at the time of the update.    Information Source(s): Patient and CareEverywhere/SureScripts via phone    Pertinent Information: PTA med list reviewed with patient by room phone.   She has not set time of day to take her meds.    Changes made to PTA medication list:  Added: All of her medicines are new to the list.  Deleted: None  Changed: None    Allergies reviewed with patient and updates made in EHR: yes, no change.    Medication History Completed By: Elizabeth Doan 4/14/2025 7:52 PM    PTA Med List   Medication Sig Last Dose/Taking    atorvastatin (LIPITOR) 80 MG tablet Take 80 mg by mouth daily. 4/11/2025 Morning    Biotin-Vitamin C (HAIR SKIN NAILS GUMMIES) 1250-50 MCG-MG CHEW Take 1 chew tab by mouth daily. 4/11/2025 Morning    Calcium Carb-Cholecalciferol (CALCIUM+D3 PO) Take 1 tablet by mouth daily. 4/11/2025 Morning    metFORMIN (GLUCOPHAGE XR) 500 MG 24 hr tablet Take 500 mg by mouth daily (with dinner). 4/11/2025 Morning    pantoprazole (PROTONIX) 40 MG EC tablet Take 1 tablet by mouth daily as needed for heartburn. Past Month

## 2025-04-15 NOTE — CONSULTS
"SPIRITUAL HEALTH SERVICES Consult Note    Medical Surgical Unit at [facility]    REFERRAL SOURCE/REASON FOR VISIT - Saw patient per length of stay.      SUMMARY - I visited Sabina, who was resting, but was open to my introduction and to brief emotional and spiritual support.  She has been hospitalized for 4 days now.  She reported that she is \"doing better.\"  She said that the plan is to discharge her to a TCU either today or tomorrow.  I inquired with Sabina if she had a Religion preference, and she responded that she does not.  I offered reassurance to her and gave her a blessing.  She smiled, for the first time, at the end of my visit.         Plan - No plan to follow.    Christian Pereira, Ph.D, [facility]  Spiritual Health Services  [address] [place], [place] [address] [phone]  [email or web address]    Assessment -     Patient/Family Understanding of Illness and Goals of Care - Patient understands her illness and goals.    Strengths, Coping, and Resources - family    Meaning, Beliefs, and Spirituality - Patient reported that she does not have a Religion preference.  "

## 2025-04-16 VITALS
OXYGEN SATURATION: 95 % | RESPIRATION RATE: 16 BRPM | SYSTOLIC BLOOD PRESSURE: 129 MMHG | WEIGHT: 205.47 LBS | HEIGHT: 66 IN | HEART RATE: 72 BPM | TEMPERATURE: 98.4 F | DIASTOLIC BLOOD PRESSURE: 61 MMHG | BODY MASS INDEX: 33.02 KG/M2

## 2025-04-16 PROCEDURE — 250N000013 HC RX MED GY IP 250 OP 250 PS 637: Performed by: NURSE PRACTITIONER

## 2025-04-16 PROCEDURE — 99207 PR NO BILLABLE SERVICE THIS VISIT: CPT | Performed by: NURSE PRACTITIONER

## 2025-04-16 PROCEDURE — 99239 HOSP IP/OBS DSCHRG MGMT >30: CPT | Performed by: NURSE PRACTITIONER

## 2025-04-16 RX ORDER — METHOCARBAMOL 500 MG/1
500 TABLET, FILM COATED ORAL 4 TIMES DAILY PRN
Qty: 45 TABLET | Refills: 1 | Status: SHIPPED | OUTPATIENT
Start: 2025-04-16

## 2025-04-16 RX ORDER — ACETAMINOPHEN 500 MG
1000 TABLET ORAL EVERY 8 HOURS PRN
Qty: 50 TABLET | Refills: 1 | Status: SHIPPED | OUTPATIENT
Start: 2025-04-16

## 2025-04-16 RX ADMIN — METHOCARBAMOL 500 MG: 500 TABLET, FILM COATED ORAL at 09:27

## 2025-04-16 RX ADMIN — OXYCODONE HYDROCHLORIDE 5 MG: 5 TABLET ORAL at 12:12

## 2025-04-16 RX ADMIN — METHOCARBAMOL 500 MG: 500 TABLET, FILM COATED ORAL at 12:12

## 2025-04-16 RX ADMIN — ACETAMINOPHEN 975 MG: 325 TABLET, FILM COATED ORAL at 10:17

## 2025-04-16 RX ADMIN — POLYETHYLENE GLYCOL 3350 17 G: 17 POWDER, FOR SOLUTION ORAL at 09:26

## 2025-04-16 RX ADMIN — APIXABAN 2.5 MG: 2.5 TABLET, FILM COATED ORAL at 09:27

## 2025-04-16 RX ADMIN — SENNOSIDES AND DOCUSATE SODIUM 2 TABLET: 50; 8.6 TABLET ORAL at 09:27

## 2025-04-16 RX ADMIN — PANTOPRAZOLE SODIUM 40 MG: 40 TABLET, DELAYED RELEASE ORAL at 09:27

## 2025-04-16 RX ADMIN — OXYCODONE HYDROCHLORIDE 5 MG: 5 TABLET ORAL at 03:22

## 2025-04-16 RX ADMIN — ACETAMINOPHEN 650 MG: 325 TABLET, FILM COATED ORAL at 03:22

## 2025-04-16 RX ADMIN — ATORVASTATIN CALCIUM 80 MG: 40 TABLET, FILM COATED ORAL at 09:27

## 2025-04-16 ASSESSMENT — ACTIVITIES OF DAILY LIVING (ADL)
ADLS_ACUITY_SCORE: 28

## 2025-04-16 NOTE — PLAN OF CARE
Goal Outcome Evaluation:      Plan of Care Reviewed With: patient          Outcome Evaluation: Irving Dennis TC

## 2025-04-16 NOTE — PROGRESS NOTES
S-(situation): Patient discharged to TCU, [facility] via wheelchair with bus    B-(background): right femur repair    A-(assessment): vss, RA, pain controlled, tolerating diet, A&O  Last bowel movement: 4/15     R-(recommendations):Report called to Nurse (sunshine). Listed belongings gathered and sent with patient.     Discharge Nursing Criteria:     Care Plan and Patient education resolved: Yes    Vaccines  Influenza status verified at discharge:  Yes    MISC  Home medications returned to patient: NA  Medication Bin checked and emptied on discharge Yes  All paperwork sent with patient/Copy of AVS given to patient or family Yes.

## 2025-04-16 NOTE — PLAN OF CARE
Goal Outcome Evaluation:      Plan of Care Reviewed With: patient    Overall Patient Progress: no change    Outcome Evaluation: A&O. PRN oxycodone given x1 for pain 7/10. ambulated to bathroom and encouraged into howard, pt only wanted to go to door and back. BWAT. dressings C,D,I. tolerating diet/fluids. no IV access. CMS intact, has a hard time lifting left. Call light in reach and can make needs known.

## 2025-04-16 NOTE — PLAN OF CARE
Physical Therapy Discharge Summary    Reason for therapy discharge:    Discharged to transitional care facility.    Progress towards therapy goal(s). See goals on Care Plan in [facility] electronic health record for goal details.  Goals partially met.  Barriers to achieving goals:   limited tolerance for therapy and discharge from facility.    Therapy recommendation(s):    Continued therapy is recommended.  Rationale/Recommendations:  to address post op mobility, safety, strength and independence to progress her back to home alone.      Thank you for your referral.  Cinthia George, PT, DPT, ATC, [facility]  O: [phone]  E: [email or web address]

## 2025-04-16 NOTE — PLAN OF CARE
Occupational Therapy Discharge Summary    Reason for therapy discharge:    Discharged to transitional care facility.     Progress towards therapy goal(s). See goals on Care Plan in [facility] electronic health record for goal details.  Goals partially met.  Barriers to achieving goals:   limited tolerance for therapy and discharge from facility.     Therapy recommendation(s):    Continued therapy is recommended.  Rationale/Recommendations:  to address ADLs, transfers, and overall safety to progress her back to home alone.      Jeffery Baer OTR/L  Occupational Therapist

## 2025-04-16 NOTE — PROGRESS NOTES
"[facility]  Orthopedics Progress Note           Assessment and Plan:    Assessment:   Post-operative day #4 Procedure(s):  INTERNAL FIXATION, FRACTURE, TROCHANTERIC, HIP, USING PINS OR RODS           Plan:   Encourage IS  Start or continue physical therapy  Activity as tolerated  Weight-bear as tolerated.  Discharge pending on insurance approval for TCU. Anticipate today.   Pain control measures  Advance diet as tolerated  Routine wound care  DVT Prophylaxis: SCD's, Compression Hose, Eliquis  Hospitalist following and assisting in medical issues.            Interval History:    Patient has been more ambulatory. Ambulating to the bathroom, chair, door of room and bed.  Pain improving.  \"Tolerable\" when out of bed.  No pain at rest. Tolerating oral intake, voiding, has had BM.  Overall feeling better than yesterday.    Awaiting on TCU insurance approval.           Review of Systems:    The patient denies any chest pain, shortness of breath, excessive pain, fever, chills, purulent drainage from the wound, nausea or vomiting.               Physical Exam:   General: awake, alert, appropriate and in no acute distress  Blood pressure (!) 149/59, pulse 74, temperature 98.3  F (36.8  C), temperature source Oral, resp. rate 16, height 1.676 m (5' 6\"), weight 93.2 kg (205 lb 7.5 oz), SpO2 95%.  Right hip:  Aquacell dressings x 3 continue to be all clean, dry and intact without shadowing. Surrounding skin intact, no breakdown. Compartments soft and non-tender. Mild swelling to thigh. Unchanged from yesterday. No pain with palpation to the thigh, knee, or hip.  Calf is soft, nontender with no significant swelling. Distal neurovascular grossly intact including 2+ distal pulse, sensation intact to foot, able to df/pf against resistance. Brisk cap refill.            Data:     No results found for this or any previous visit (from the past 24 hours).    Discussed with JOSÉ Phillip, " CNP  Orthopedic Surgery

## 2025-04-16 NOTE — PLAN OF CARE
Goal Outcome Evaluation:      Plan of Care Reviewed With: patient    Overall Patient Progress: no changeOverall Patient Progress: no change    Outcome Evaluation: Pt A/Ox4. VSS on RA. Continued pain to right hip, given Tylenol and Oxycodone. Ambulated to the bathroom, still needs to ambulate in halls. Dressing C/D/I. CMS intact. Up with Ax1.

## 2025-04-16 NOTE — DISCHARGE INSTRUCTIONS
Internal Fixation of a Hip Fracture Discharge Instructions                                      General Care:  After surgery you may feel tired/sleepy. This is normal. If you have any question along the way please contact the office. If you feel it is an issue cannot wait for normal office hours, contact the on-call physician. You should not drive or operate machines/equipment until released by your physician to do so.   It is not uncommon to go to a rehab facility from the hospital to allow you to continue to heal and get more mobile.     Bandages:   Keep incision covered with hospital dressing (Aquacel) for one week. It is okay to shower with this dressing on. However, do not submerge your dressing and incision in water for roughly 2 weeks. After one week remove this dressing and then keep it clean, dry, and covered. If this dressing become looses or falls off it is ok to cover with gauze and tape.  Wash the incision gently with warm soapy water after removing your hospital dressing and lightly pat dry. Do not use ointments, lotions, or creams on the incisions      Bathing:  Do not submerge your incision in water such as a bath or pool. It is ok to shower when you get home but keep your incision covered with a sealed bandage. You may find in comfortable to get a shower chair for the first few months while you continue to heal and get stronger.     Follow up:  Your follow up appointment should already be scheduled. If its not, please call the office to verify an appointment 14 days after surgery.     Diet:  You may not have a full appetite at discharge this should get better. Progress to bland foods such as crackers and bread and finally to your normal diet if you have no problems. Avoid alcohol when taking narcotic pain medications.      Pain control:  Take your pain medications as prescribed. These medications may make you sleepy. Do not drive, operate equipment, or drink alcohol when taking these.  You may take  Tylenol (Generic name is acetaminophen) as directed on the bottle for additional relief or in place of the prescribed pain medications as your pain gets better. Do not take any other NSAIDs (Motrin, Advil, Ibuprofen, Aleve, Naproxen) while getting your Eliquis. If the medications cause a reaction such as nausea or skin rash, stop taking them and contact your doctor. Please plan accordingly, pain medications will not be re-filled on the weekends or at night. Call the office during the day if you need more medications.    Blood thinner:  It is very important to use your blood thinner as prescribed. It is a medication to help prevent blood clots in your legs or lungs. No medication is perfect, so if you notice a sudden onset of pain/swelling in your calf area call your doctor. If you notice a sudden onset of troubles breathing and/or chest pain call 911.     Icing:  It is common for some swelling, aching and stiffness to occur for while after a hip surgery.  Apply for 20 minutes at a time. For the first 1-2 weeks apply ice 2-3 x day or more after therapy.    Walker/crutches:  Use a walker/crutches when you go home. You will transition to the use of a cane and finally to no additional support.     Physical Therapy:  Therapy will work on keeping you mobile and active. The amount of therapy will be determined by your progress, health, and endurance.     Activity:  Unless otherwise instructed, you can weight bear as tolerated with a walker/cane. You will gradually become more confident and be able to get around better.    Normal findings after surgery:  Numbness and tenderness around the incisions.   You may have bruising around the incisions and down the thigh.   Low grade fevers less than 100.5 degrees Fahrenheit are normal.   You will have some increased pain after your therapy sessions.     When to call the Office:  Temperature greater than 101.5 degrees Fahreheit.  Fever, chills, and increasing pain in the  hip.  Excessive drainage from the incisions that include bright red blood.  Drainage from the incisions sites that appear yellow, pus-like, or foul smelling.  Increasing pain the hip not relieved by the prescribed pain medications or ice.  Increased pain or swelling in your calf area (in back above your ankle) that wasn t there when in the hospital.  Any other effects you feel are significant.  Call 911 if you experience any chest pain and/or shortness or breath.

## 2025-04-16 NOTE — PROGRESS NOTES
Care Management Discharge Note    Discharge Date: 04/16/2025     Discharge Disposition: Transitional Care    Discharge Services: Transportation Services    Discharge DME: None    Discharge Transportation: agency    Private pay costs discussed: Not applicable    Does the patient's insurance plan have a 3 day qualifying hospital stay waiver?  No    PAS Confirmation Code: [id number]  Patient/family educated on Medicare website which has current facility and service quality ratings: yes    Education Provided on the Discharge Plan: Yes  Persons Notified of Discharge Plans: Patient  Patient/Family in Agreement with the Plan: yes    Handoff Referral Completed: Yes, [facility] PCP: Internal handoff referral completed    Additional Information:  Patient is medically ready for discharge today, per provider. Plan is for patient to discharge to [facility] (Main Phone: [phone] Admissions Phone: [phone] Fax: [phone]) TCU.    Spoke with Joey at [facility], and he will transport with [facility] bus at 1400.    PEGGY Sanon  [facility] [phone]/ [facility] [phone]  Care Management

## 2025-04-16 NOTE — DISCHARGE SUMMARY
"[facility] Discharge Summary     Sabina Rm MRN# [id number]   YOB: 1955 Age: 70 year old     Date of Admission:  4/11/2025  Date of Discharge:  4/16/2025  Admitting Physician:  Jeffery Oseguera DO  Discharge Physician:  JOSÉ Hoang CNP  Discharging Service:  Orthopedics     Primary Provider: [facility], [facility]          Admission Diagnoses:   Closed displaced intertrochanteric fracture of right femur, initial encounter (H) [S72.141A]  S/P ORIF (open reduction internal fixation) fracture [Z98.890, Z87.81]          Discharge Diagnosis:     Active Problems:    Closed displaced intertrochanteric fracture of right femur, initial encounter (H)    Osteopenia of multiple sites    S/P ORIF (open reduction internal fixation) fracture               Discharge Disposition:     Discharge to TCU           Condition on Discharge:     Discharge condition: Stable   Discharge vitals: Blood pressure (!) 149/59, pulse 74, temperature 98.3  F (36.8  C), temperature source Oral, resp. rate 16, height 1.676 m (5' 6\"), weight 93.2 kg (205 lb 7.5 oz), SpO2 95%.   Code status on discharge: Full Code           Procedures / Labs / Imaging:   No other procedures performed during this admission          Medications Prior to Admission:     Medications Prior to Admission   Medication Sig Dispense Refill Last Dose/Taking    atorvastatin (LIPITOR) 80 MG tablet Take 80 mg by mouth daily.   4/11/2025 Morning    Biotin-Vitamin C (HAIR SKIN NAILS GUMMIES) 1250-50 MCG-MG CHEW Take 1 chew tab by mouth daily.   4/11/2025 Morning    Calcium Carb-Cholecalciferol (CALCIUM+D3 PO) Take 1 tablet by mouth daily.   4/11/2025 Morning    metFORMIN (GLUCOPHAGE XR) 500 MG 24 hr tablet Take 500 mg by mouth daily (with dinner).   4/11/2025 Morning    pantoprazole (PROTONIX) 40 MG EC tablet Take 1 tablet by mouth daily as needed for heartburn.   Past Month    [DISCONTINUED] apixaban ANTICOAGULANT (ELIQUIS) 2.5 " MG tablet Take 1 tablet (2.5 mg) by mouth 2 times daily 60 tablet 0              Discharge Medications:     Current Discharge Medication List        START taking these medications    Details   acetaminophen (TYLENOL) 500 MG tablet Take 2 tablets (1,000 mg) by mouth every 8 hours as needed for mild pain or other (and adjunct with moderate or severe pain or per patient request).  Qty: 70 tablet, Refills: 0    Associated Diagnoses: S/P ORIF (open reduction internal fixation) fracture      hydrOXYzine HCl (ATARAX) 10 MG tablet Take 1 tablet (10 mg) by mouth every 6 hours as needed for other (adjuvant pain).  Qty: 45 tablet, Refills: 0    Associated Diagnoses: S/P ORIF (open reduction internal fixation) fracture      oxyCODONE (ROXICODONE) 5 MG tablet Take 1 tablet (5 mg) by mouth every 4 hours as needed for moderate pain.  Qty: 30 tablet, Refills: 0    Associated Diagnoses: S/P ORIF (open reduction internal fixation) fracture      senna-docusate (SENOKOT-S/PERICOLACE) 8.6-50 MG tablet Take 2 tablets by mouth 2 times daily as needed for constipation (while on narcotics).  Qty: 45 tablet, Refills: 0    Associated Diagnoses: S/P ORIF (open reduction internal fixation) fracture      tiZANidine (ZANAFLEX) 2 MG tablet Take 1 tablet (2 mg) by mouth every 8 hours as needed for muscle spasms.  Qty: 45 tablet, Refills: 1    Associated Diagnoses: S/P ORIF (open reduction internal fixation) fracture           CONTINUE these medications which have CHANGED    Details   apixaban ANTICOAGULANT (ELIQUIS) 2.5 MG tablet Take 1 tablet (2.5 mg) by mouth 2 times daily for 21 days.  Qty: 42 tablet, Refills: 0    Associated Diagnoses: COVID-19           CONTINUE these medications which have NOT CHANGED    Details   atorvastatin (LIPITOR) 80 MG tablet Take 80 mg by mouth daily.      Biotin-Vitamin C (HAIR SKIN NAILS GUMMIES) 1250-50 MCG-MG CHEW Take 1 chew tab by mouth daily.      Calcium Carb-Cholecalciferol (CALCIUM+D3 PO) Take 1 tablet by  mouth daily.      metFORMIN (GLUCOPHAGE XR) 500 MG 24 hr tablet Take 500 mg by mouth daily (with dinner).      pantoprazole (PROTONIX) 40 MG EC tablet Take 1 tablet by mouth daily as needed for heartburn.                   Consultations:     Consultation during this admission received from hospitalist             Brief History of Illness:   See operative report          Hospital Course:     Continued admission following gamma nail ORIF to the right hip.  Initially had difficulty with out of bed due to pain (and per therapy patient admitted to them fear of pain) and resistance to activity.  By POD3 did start to ambulate some was able to transfer more. Therapy working with patient, recommended TCU.  Patient and family agreeable to this. Pain was absent at rest since POD0, but by POD3 pain was tolerable with out of bed. She was tolerating oral intake, voiding. Once prior auth was received for TCU, patient was discharged uneventfully.      Eliquis for DVT prevention for 21 days.                     Significant Results:     None             Pending Results:     None           Discharge Instructions and Follow-Up:     Discharge diet: Regular   Discharge activity: Activity as tolerated   Discharge follow-up: 14 days   Effected Extremity Right leg       Weight Bearing status Weight bearing as tolerated       Lines and drains: None    Wound care: Keep incision covered with hospital dressing (Aquacel) for one week. It is okay to shower with this dressing on. However, do not submerge your dressing and incision in water for roughly 2 weeks. After one week remove this dressing and then keep it clean, dry, and covered. If this dressing become looses or falls off it is ok to cover with gauze and tape.  Wash the incision gently with warm soapy water after removing your hospital dressing and lightly pat dry. Do not use ointments, lotions, or creams on the incisions           JOSÉ Douglas, CNP  Orthopedic Surgery

## 2025-04-16 NOTE — PROGRESS NOTES
"[facility]    Medicine Progress Note - Hospitalist Service    Date of Admission:  4/11/2025    Assessment & Plan   70 year old female who presents after a fall at home.  She reportedly stepped backwards and fell sideways onto her right hip resulting in significant pain and inability to tolerate with her weight.  She denied hitting her head feeling dizzy, having chest pain chest pain, shortness of breath, cough, abdominal pain or any numbness or tingling of her extremities.  She does not take any medications there is noted to be Eliquis in her medical history she does not take these medications that she is aware of however we later found out per discussion with her and review of family physician records that she does have prediabetes, dyslipidemia and GERD.     Displaced, Rt intertrochanteric femur fracture   POD #2 repair rt femur  -plan per Orthopedics  - To help with mobility tolerance, did adjust pain medication regimen with scheduled Tylenol and scheduled Robaxin  - Plan for TCU    Drug induced constipation.    -increase bowel regimen with frequent opioid dosing  -continue senna     GERD continue home PPI     Prediabetes per clinic records.  Recently started on Metformin - continue     dyslipidemia.  Continue atorvastin 80 mg daily     Class 1 obesity.  Following outpatient      Osteopenia  - Vit D level 35          Diet: Regular Diet Adult Thin Liquids (level 0)  Diet    DVT Prophylaxis: DOAC  Spivey Catheter: Not present  Lines: None     Cardiac Monitoring: None  Code Status: Full Code      Clinically Significant Risk Factors                              # Obesity: Estimated body mass index is 33.16 kg/m  as calculated from the following:    Height as of this encounter: 1.676 m (5' 6\").    Weight as of this encounter: 93.2 kg (205 lb 7.5 oz)., PRESENT ON ADMISSION            Social Drivers of Health    Housing Stability: High Risk (4/11/2025)    Housing Stability     Do you have " housing? : No     Are you worried about losing your housing?: No   Tobacco Use: Medium Risk (4/11/2025)    Patient History     Smoking Tobacco Use: Former     Smokeless Tobacco Use: Never   Alcohol Use: Alcohol Misuse (7/11/2023)    Received from [facility]     AUDIT-C     Frequency of Alcohol Consumption: 2-4 times a month     Average Number of Drinks: 5 or 6     Frequency of Binge Drinking: Never          Disposition Plan     Medically Ready for Discharge: Ready Now  Patient has been medically stable   She was seen by orthopedic team this am and discharged which is appropriate            Nikky Rosa,ROSANNA APRN CNP  Hospitalist Service  [facility]  Securely message with [employer] (more info)  Text page via [facility] Paging/Directory   ______________________________________________________________________    Interval History   This morning, patient is seen with her daughter at bedside. Pain is much improved. Medically stable and ok for discharge. Orthopedic team has already completed discharge this am and agree with plan.     Physical Exam   Vital Signs: Temp: 98.4  F (36.9  C) Temp src: Oral BP: 129/61 Pulse: 72   Resp: 16 SpO2: 95 % O2 Device: None (Room air)    Weight: 205 lbs 7.5 oz    Physical Exam  Constitutional:       Appearance: Normal appearance.   HENT:      Mouth/Throat:      Mouth: Mucous membranes are moist.   Pulmonary:      Effort: Pulmonary effort is normal.   Skin:     General: Skin is warm.   Neurological:      General: No focal deficit present.      Mental Status: She is alert and oriented to person, place, and time.   Psychiatric:         Mood and Affect: Mood normal.          Medical Decision Making       30 MINUTES SPENT BY ME on the date of service doing chart review, history, exam, documentation & further activities per the note.      Data         Imaging results reviewed over the past 24 hrs:   No results found for this or any previous visit (from the  past 24 hours).

## 2025-04-16 NOTE — DISCHARGE SUMMARY
"[facility]  Hospitalist Discharge Summary      Date of Admission:  4/11/2025  Date of Discharge:  4/16/2025  Discharging Provider: Nikky Rosa DNP APRN CNP  Discharge Service: Hospitalist Service    Discharge Diagnoses   Displaced right intratrochanteric femur fracture, status post internal fixation  Drug-induced constipation, improving  Gastroesophageal reflux disease  Prediabetes  Hyperlipidemia  Class I obesity  Osteopenia      Clinically Significant Risk Factors     # Obesity: Estimated body mass index is 33.16 kg/m  as calculated from the following:    Height as of this encounter: 1.676 m (5' 6\").    Weight as of this encounter: 93.2 kg (205 lb 7.5 oz).       Follow-ups Needed After Discharge   Follow-up Appointments       Follow Up and recommended labs and tests      Follow-up with Dr. Oseguera in 2 weeks                    Discharge Disposition     Condition at discharge: Stable    Hospital Course   70 year old female who presents after a fall at home.  She reportedly stepped backwards and fell sideways onto her right hip resulting in significant pain and inability to tolerate with her weight.  She denied hitting her head feeling dizzy, having chest pain chest pain, shortness of breath, cough, abdominal pain or any numbness or tingling of her extremities.  She does not take any medications there is noted to be Eliquis in her medical history she does not take these medications that she is aware of however we later found out per discussion with her and review of family physician records that she does have prediabetes, dyslipidemia and GERD.    While admitted, patient underwent right femur fixation with pinning completed by Dr. Oseguera on 4/12/2025.  There was no significant surgical events.  Patient did have significant pain impairing mobility postoperatively, improving with pain management however therapies did find that she would benefit from continued rehabilitation.  Patient " will be discharged to TCU for continued therapy services.  She will follow-up with orthopedic team as instructed.     Displaced, Rt intertrochanteric femur fracture   S/p fixation with pinning completed 4/12  -plan per Orthopedics  - To help with mobility tolerance, did adjust pain medication regimen with scheduled Tylenol and scheduled Robaxin  - Plan for TCU    Drug induced constipation.   -increase bowel regimen with frequent opioid dosing  -senna continued      GERD continue home PPI     Prediabetes per clinic records.  Recently started on Metformin - continue     dyslipidemia.  Continue atorvastin 80 mg daily     Class 1 obesity.  Following outpatient      Osteopenia  - Vit D level 35    Consultations This Hospital Stay   PHYSICAL THERAPY ADULT IP CONSULT  OCCUPATIONAL THERAPY ADULT IP CONSULT  CARE MANAGEMENT / SOCIAL WORK IP CONSULT  PHYSICAL THERAPY ADULT IP CONSULT  CARE MANAGEMENT / SOCIAL WORK IP CONSULT  PHYSICAL THERAPY ADULT IP CONSULT  OCCUPATIONAL THERAPY ADULT IP CONSULT    Code Status   Full Code    Time Spent on this Encounter   I, Nikky Rosa DNP APRN CNP, personally saw the patient today and spent greater than 30 minutes discharging this patient.       ROSANNA Thomas CNP  [address] SURGICAL  [address] [place] [address]  Phone: [phone]  ______________________________________________________________________    Physical Exam   Vital Signs: Temp: 98.4  F (36.9  C) Temp src: Oral BP: 129/61 Pulse: 72   Resp: 16 SpO2: 95 % O2 Device: None (Room air)    Weight: 205 lbs 7.5 oz  Physical Exam  Constitutional:       Appearance: Normal appearance.   HENT:      Mouth/Throat:      Mouth: Mucous membranes are moist.   Pulmonary:      Effort: Pulmonary effort is normal.   Musculoskeletal:      Comments: Right femur fracture s/p fixation   Skin:     General: Skin is warm.   Neurological:      General: No focal deficit present.      Mental Status:  She is alert and oriented to person, place, and time.   Psychiatric:         Mood and Affect: Mood normal.             Primary Care Physician   [facility]    Discharge Orders      Anti-Embolism Stockings    Bilateral thigh high length.  On in the morning, off at night     General info for SNF    Length of Stay Estimate: Short Term Care: Estimated # of Days <30  Condition at Discharge: Stable  Level of care:skilled   Rehabilitation Potential: Good  Admission H&P remains valid and up-to-date: Yes  Recent Chemotherapy: N/A  Use Nursing Home Standing Orders: Yes     Mantoux instructions    Give two-step Mantoux (PPD) Per Facility Policy Yes     Follow Up and recommended labs and tests    Follow-up with Dr. Oseguera in 2 weeks     Reason for your hospital stay    A fall with hip fracture and subsequent hip surgery     Wound care (specify)    Keep incision covered with hospital dressing (Aquacel) for one week. It is okay to shower with this dressing on. However, do not submerge your dressing and incision in water for roughly 2 weeks. After one week remove this dressing and then keep it clean, dry, and covered. If this dressing become looses or falls off it is ok to cover with gauze and tape.  Wash the incision gently with warm soapy water after removing your hospital dressing and lightly pat dry. Do not use ointments, lotions, or creams on the incisions     Activity - Ambulate in hallway    Every shift     Activity - Up with nursing assistance     Activity - Up with assistive device     NO CPM     Weight bearing status    Weight bearing as tolerated     Full Code     Physical Therapy Adult Consult    Evaluate and treat as clinically indicated.    Reason:  s/p hip fracture with ORIF. Weight bear as tolerated. No hip precautions     Occupational Therapy Adult Consult    Evaluate and treat as clinically indicated.      Reason:  s/p hip fracture with ORIF. Weight bear as tolerated. No hip precautions     Fall  precautions     Crutches DME    DME Documentation: Describe the reason for need to support medical necessity: Impaired gait status post hip surgery. I, the undersigned, certify that the above prescribed supplies are medically necessary for this patient and is both reasonable and necessary in reference to accepted standards of medical practice in the treatment of this patient's condition and is not prescribed as a convenience.     Cane DME    DME Documentation: Describe the reason for need to support medical necessity: Impaired gait status post hip surgery. I, the undersigned, certify that the above prescribed supplies are medically necessary for this patient and is both reasonable and necessary in reference to accepted standards of medical practice in the treatment of this patient's condition and is not prescribed as a convenience.     Walker DME    DME Documentation: Describe the reason for need to support medical necessity: Impaired gait status post hip surgery. I, the undersigned, certify that the above prescribed supplies are medically necessary for this patient and is both reasonable and necessary in reference to accepted standards of medical practice in the treatment of this patient's condition and is not prescribed as a convenience.     Diet    Follow this diet upon discharge: Current Diet:Orders Placed This Encounter      Regular Diet Adult Thin Liquids (level 0)       Significant Results and Procedures   Results for orders placed or performed during the hospital encounter of 04/11/25   XR Pelvis w Hip Right G/E 2 Views    Narrative    EXAM: XR PELVIS AND HIP RIGHT 2 VIEWS  LOCATION: [facility]  DATE: 4/11/2025    INDICATION: fall on lateral hip  COMPARISON: None.      Impression    IMPRESSION: Acute intertrochanteric fracture of the proximal right femur with minimal displacement. There is normal joint alignment. Mild degenerative changes throughout the pelvis. Moderate to  severe degenerative changes of the lower lumbar spine.   Osteopenia.   XR Surgery SATNAM L/T 5 Min Fluoro w Stills    Narrative    This exam was marked as non-reportable because it will not be read by a   radiologist or a [place] non-radiologist provider.         XR Pelvis w Hip Right G/E 2 Views    Narrative    EXAM: XR PELVIS AND HIP RIGHT 2 VIEWS  LOCATION: [facility]  DATE: 4/14/2025    INDICATION: s p ORIF of the right hip. having pain  COMPARISON: 04/11/2025.      Impression    IMPRESSION:     Interval postoperative changes from ORIF of the right proximal femur fracture in good anatomic alignment. Hardware appears intact and well seated. Otherwise, unchanged       Discharge Medications   Current Discharge Medication List        START taking these medications    Details   !! acetaminophen (TYLENOL) 500 MG tablet Take 2 tablets (1,000 mg) by mouth every 8 hours as needed for mild pain.  Qty: 50 tablet, Refills: 1    Associated Diagnoses: S/P ORIF (open reduction internal fixation) fracture      !! acetaminophen (TYLENOL) 500 MG tablet Take 2 tablets (1,000 mg) by mouth every 8 hours as needed for mild pain or other (and adjunct with moderate or severe pain or per patient request).  Qty: 70 tablet, Refills: 0    Associated Diagnoses: S/P ORIF (open reduction internal fixation) fracture      hydrOXYzine HCl (ATARAX) 10 MG tablet Take 1 tablet (10 mg) by mouth every 6 hours as needed for other (adjuvant pain).  Qty: 45 tablet, Refills: 0    Associated Diagnoses: S/P ORIF (open reduction internal fixation) fracture      methocarbamol (ROBAXIN) 500 MG tablet Take 1 tablet (500 mg) by mouth 4 times daily as needed for muscle spasms (pain).  Qty: 45 tablet, Refills: 1    Associated Diagnoses: S/P ORIF (open reduction internal fixation) fracture      oxyCODONE (ROXICODONE) 5 MG tablet Take 1 tablet (5 mg) by mouth every 4 hours as needed for moderate pain.  Qty: 30 tablet, Refills: 0     Associated Diagnoses: S/P ORIF (open reduction internal fixation) fracture      senna-docusate (SENOKOT-S/PERICOLACE) 8.6-50 MG tablet Take 2 tablets by mouth 2 times daily as needed for constipation (while on narcotics).  Qty: 45 tablet, Refills: 0    Associated Diagnoses: S/P ORIF (open reduction internal fixation) fracture      tiZANidine (ZANAFLEX) 2 MG tablet Take 1 tablet (2 mg) by mouth every 8 hours as needed for muscle spasms.  Qty: 45 tablet, Refills: 1    Associated Diagnoses: S/P ORIF (open reduction internal fixation) fracture       !! - Potential duplicate medications found. Please discuss with provider.        CONTINUE these medications which have CHANGED    Details   apixaban ANTICOAGULANT (ELIQUIS) 2.5 MG tablet Take 1 tablet (2.5 mg) by mouth 2 times daily for 21 days.  Qty: 42 tablet, Refills: 0    Associated Diagnoses: COVID-19           CONTINUE these medications which have NOT CHANGED    Details   atorvastatin (LIPITOR) 80 MG tablet Take 80 mg by mouth daily.      Biotin-Vitamin C (HAIR SKIN NAILS GUMMIES) 1250-50 MCG-MG CHEW Take 1 chew tab by mouth daily.      Calcium Carb-Cholecalciferol (CALCIUM+D3 PO) Take 1 tablet by mouth daily.      metFORMIN (GLUCOPHAGE XR) 500 MG 24 hr tablet Take 500 mg by mouth daily (with dinner).      pantoprazole (PROTONIX) 40 MG EC tablet Take 1 tablet by mouth daily as needed for heartburn.           Allergies   Allergies   Allergen Reactions    Aspirin Anaphylaxis

## 2025-04-17 ENCOUNTER — PATIENT OUTREACH (OUTPATIENT)
Dept: CARE COORDINATION | Facility: CLINIC | Age: 70
End: 2025-04-17
Payer: COMMERCIAL

## 2025-04-17 ENCOUNTER — LAB REQUISITION (OUTPATIENT)
Dept: LAB | Facility: CLINIC | Age: 70
End: 2025-04-17
Payer: COMMERCIAL

## 2025-04-17 DIAGNOSIS — Z11.1 ENCOUNTER FOR SCREENING FOR RESPIRATORY TUBERCULOSIS: ICD-10-CM

## 2025-04-17 DIAGNOSIS — Z98.890 S/P ORIF (OPEN REDUCTION INTERNAL FIXATION) FRACTURE: Primary | ICD-10-CM

## 2025-04-17 DIAGNOSIS — Z87.81 S/P ORIF (OPEN REDUCTION INTERNAL FIXATION) FRACTURE: Primary | ICD-10-CM

## 2025-04-17 NOTE — PROGRESS NOTES
Connected [facility] Resource Center    Background: Transitional Care Management program identified per system criteria and reviewed by Connected Care Resource Center team for possible outreach.    Assessment: Upon chart review, CCRC Team member will not proceed with patient outreach related to this episode of Transitional Care Management program due to reason below:    Non-[facility] TCU: CCRC team member noted patient discharged to TCU/ARU/LTACH. Patient is not established with a [facility] currently supported by Primary Care-Care Coordination therefore handoff to Primary Care-Care Coordination is not appropriate at this time.    Plan: Transitional Care Management episode addressed appropriately per reason noted above.      PRATEEK Garcia  Connected Care Resource [place], [facility]    *Connected Care Resource Team does NOT follow patient ongoing. Referrals are identified based on internal discharge reports and the outreach is to ensure patient has an understanding of their discharge instructions.

## 2025-04-17 NOTE — PROGRESS NOTES
Orthopedic Clinic Post-Operative Note    CHIEF COMPLAINT: No chief complaint on file.      HISTORY OF PRESENT ILLNESS  Today's visit:  Returns with her daughter today.  Pain is improving.  Therapies been working well.  Going home today.      April 12, 2025 surgery: Right hip intertrochanteric cephalomedullary nail fixation    Patient's past medical, surgical, social and family histories reviewed.     No past medical history on file.    Past Surgical History:   Procedure Laterality Date    OPEN REDUCTION INTERNAL FIXATION HIP NAILING Right 4/12/2025    Procedure: INTERNAL FIXATION, FRACTURE, TROCHANTERIC, RIGHT HIP, USING PINS;  Surgeon: Jeffery Oseguera DO;  Location:  OR       Medications:  Current Outpatient Medications   Medication Sig Dispense Refill    acetaminophen (TYLENOL) 500 MG tablet Take 2 tablets (1,000 mg) by mouth every 8 hours as needed for mild pain or other (and adjunct with moderate or severe pain or per patient request). 70 tablet 0    apixaban ANTICOAGULANT (ELIQUIS) 2.5 MG tablet Take 1 tablet (2.5 mg) by mouth 2 times daily for 11 days. 22 tablet 0    oxyCODONE (ROXICODONE) 5 MG tablet Take 1 tablet (5 mg) by mouth every 4 hours as needed for moderate pain. 30 tablet 0    tiZANidine (ZANAFLEX) 2 MG tablet Take 1 tablet (2 mg) by mouth every 8 hours as needed for muscle spasms. 45 tablet 1    acetaminophen (TYLENOL) 500 MG tablet Take 2 tablets (1,000 mg) by mouth every 8 hours as needed for mild pain. 50 tablet 1    atorvastatin (LIPITOR) 80 MG tablet Take 80 mg by mouth daily.      Biotin-Vitamin C (HAIR SKIN NAILS GUMMIES) 1250-50 MCG-MG CHEW Take 1 chew tab by mouth daily.      Calcium Carb-Cholecalciferol (CALCIUM+D3 PO) Take 1 tablet by mouth daily.      hydrOXYzine HCl (ATARAX) 10 MG tablet Take 1 tablet (10 mg) by mouth every 6 hours as needed for other (adjuvant pain). 45 tablet 0    metFORMIN (GLUCOPHAGE XR) 500 MG 24 hr tablet Take 500 mg by mouth daily (with dinner).       methocarbamol (ROBAXIN) 500 MG tablet Take 1 tablet (500 mg) by mouth 4 times daily as needed for muscle spasms (pain). 45 tablet 1    pantoprazole (PROTONIX) 40 MG EC tablet Take 1 tablet by mouth daily as needed for heartburn.      senna-docusate (SENOKOT-S/PERICOLACE) 8.6-50 MG tablet Take 2 tablets by mouth 2 times daily as needed for constipation (while on narcotics). 45 tablet 0     No current facility-administered medications for this visit.       Allergies   Allergen Reactions    Aspirin Anaphylaxis       Social History     Occupational History    Not on file   Tobacco Use    Smoking status: Former     Types: Cigarettes    Smokeless tobacco: Never   Substance and Sexual Activity    Alcohol use: Yes     Comment: occ    Drug use: Not on file    Sexual activity: Not on file       No family history on file.    REVIEW OF SYSTEMS  General: negative for, night sweats, dizziness, fatigue  Resp: No shortness of breath and no cough  CV: negative for chest pain, syncope or near-syncope  GI: negative for nausea, vomiting and diarrhea  : negative for dysuria and hematuria  Musculoskeletal: as above  Neurologic: negative for syncope   Hematologic: negative for bleeding disorder    Physical Exam:  Vitals: There were no vitals taken for this visit.  BMI= There is no height or weight on file to calculate BMI.  Constitutional: healthy, alert and no acute distress   Psychiatric: mentation appears normal and affect normal/bright  NEURO: no focal deficits  SKIN: .healing well, well approximated skin edges, without signs of infection including no erythema, incision breakdown or purlent drainage  JOINT/EXTREMITIES: Minimal edema distally.  Distal neurovascular intact.  Able to go from a seated to stand position with no difficulty.  GAIT: not tested     Diagnostic Modalities:  Right hip x-rays: Weightbearing views taken in the clinic shows a short locked gamma nail in place.  Intertrochanteric hip fracture with lucency present.   Nondisplaced.  No change in hardware alignment.  Independent visualization of the images was performed.      Impression: No chief complaint on file.  April 12, 2025 surgery: Right hip intertrochanteric cephalomedullary nail fixation    Plan:   Doing well.  Continue working with physical therapy.  Being discharged out of the nursing home today.  Plan to see her back in 4 weeks.  Eliquis prescription provided.  Return to clinic 4, week(s), or sooner as needed for changes.    Re-x-ray on return: Yes.  Weightbearing right hip    Nile Oseguera D.O.

## 2025-04-18 PROCEDURE — 86481 TB AG RESPONSE T-CELL SUSP: CPT | Mod: ORL | Performed by: FAMILY MEDICINE

## 2025-04-18 PROCEDURE — P9604 ONE-WAY ALLOW PRORATED TRIP: HCPCS | Mod: ORL | Performed by: FAMILY MEDICINE

## 2025-04-18 PROCEDURE — 36415 COLL VENOUS BLD VENIPUNCTURE: CPT | Mod: ORL | Performed by: FAMILY MEDICINE

## 2025-04-19 LAB
GAMMA INTERFERON BACKGROUND BLD IA-ACNC: 0.07 IU/ML
M TB IFN-G BLD-IMP: NEGATIVE
M TB IFN-G CD4+ BCKGRND COR BLD-ACNC: 2.63 IU/ML
MITOGEN IGNF BCKGRD COR BLD-ACNC: 0.01 IU/ML
MITOGEN IGNF BCKGRD COR BLD-ACNC: 0.02 IU/ML
QUANTIFERON MITOGEN: 2.7 IU/ML
QUANTIFERON NIL TUBE: 0.07 IU/ML
QUANTIFERON TB1 TUBE: 0.08 IU/ML
QUANTIFERON TB2 TUBE: 0.09

## 2025-04-23 ENCOUNTER — ANCILLARY PROCEDURE (OUTPATIENT)
Dept: GENERAL RADIOLOGY | Facility: CLINIC | Age: 70
End: 2025-04-23
Attending: NURSE PRACTITIONER
Payer: COMMERCIAL

## 2025-04-23 ENCOUNTER — OFFICE VISIT (OUTPATIENT)
Dept: ORTHOPEDICS | Facility: CLINIC | Age: 70
End: 2025-04-23
Payer: COMMERCIAL

## 2025-04-23 ENCOUNTER — TELEPHONE (OUTPATIENT)
Dept: ORTHOPEDICS | Facility: CLINIC | Age: 70
End: 2025-04-23

## 2025-04-23 DIAGNOSIS — Z87.81 S/P ORIF (OPEN REDUCTION INTERNAL FIXATION) FRACTURE: Primary | ICD-10-CM

## 2025-04-23 DIAGNOSIS — Z87.81 S/P ORIF (OPEN REDUCTION INTERNAL FIXATION) FRACTURE: ICD-10-CM

## 2025-04-23 DIAGNOSIS — Z98.890 S/P ORIF (OPEN REDUCTION INTERNAL FIXATION) FRACTURE: ICD-10-CM

## 2025-04-23 DIAGNOSIS — Z98.890 S/P ORIF (OPEN REDUCTION INTERNAL FIXATION) FRACTURE: Primary | ICD-10-CM

## 2025-04-23 PROCEDURE — 73502 X-RAY EXAM HIP UNI 2-3 VIEWS: CPT | Mod: TC | Performed by: RADIOLOGY

## 2025-04-23 PROCEDURE — 99024 POSTOP FOLLOW-UP VISIT: CPT | Performed by: ORTHOPAEDIC SURGERY

## 2025-04-23 PROCEDURE — 1125F AMNT PAIN NOTED PAIN PRSNT: CPT | Performed by: ORTHOPAEDIC SURGERY

## 2025-04-23 ASSESSMENT — PAIN SCALES - GENERAL: PAINLEVEL_OUTOF10: SEVERE PAIN (7)

## 2025-04-23 NOTE — TELEPHONE ENCOUNTER
Reason for Call:  Form, our goal is to have forms completed with 72 hours, however, some forms may require a visit or additional information.    Type of letter, form or note:  FMLA    Who is the form from?:  Patient's employer (if other please explain)    Where did the form come from: Patient or family brought in       What clinic location was the form placed at?: [facility]    Where the form was placed:  Dr Oseguera  Box/Folder    What number is listed as a contact on the form?: n/a       Additional comments: Please complete form and fax to [phone] Fatmata Blas, patient's daughter, has requested the original copies of the FMLA be given to Sabina at her appointment on 5/22/25    Call taken on 4/23/2025 at 11:30 AM by Lakeisha Delacruz

## 2025-04-23 NOTE — TELEPHONE ENCOUNTER
"Patient's form was filled out, reviewed, and signed by provider.      Form was faxed to the designated fax number: [phone]    A copy was sent to scanning.     A copy was placed in the lower level \"faxed\" file/drawer.      A copy was mailed to patient's home address.     Jacqueline Ortez RN   [facility]    "

## 2025-04-23 NOTE — LETTER
4/23/2025      Sabina Rm  [id number] [address] [place] [id number]      Dear Colleague,    Thank you for referring your patient, Sabina Rm, to the [facility]. Please see a copy of my visit note below.    Orthopedic Clinic Post-Operative Note    CHIEF COMPLAINT: No chief complaint on file.      HISTORY OF PRESENT ILLNESS  Today's visit:  Returns with her daughter today.  Pain is improving.  Therapies been working well.  Going home today.      April 12, 2025 surgery: Right hip intertrochanteric cephalomedullary nail fixation    Patient's past medical, surgical, social and family histories reviewed.     No past medical history on file.    Past Surgical History:   Procedure Laterality Date     OPEN REDUCTION INTERNAL FIXATION HIP NAILING Right 4/12/2025    Procedure: INTERNAL FIXATION, FRACTURE, TROCHANTERIC, RIGHT HIP, USING PINS;  Surgeon: Jeffery Oseguera DO;  Location:  OR       Medications:  Current Outpatient Medications   Medication Sig Dispense Refill     acetaminophen (TYLENOL) 500 MG tablet Take 2 tablets (1,000 mg) by mouth every 8 hours as needed for mild pain or other (and adjunct with moderate or severe pain or per patient request). 70 tablet 0     apixaban ANTICOAGULANT (ELIQUIS) 2.5 MG tablet Take 1 tablet (2.5 mg) by mouth 2 times daily for 11 days. 22 tablet 0     oxyCODONE (ROXICODONE) 5 MG tablet Take 1 tablet (5 mg) by mouth every 4 hours as needed for moderate pain. 30 tablet 0     tiZANidine (ZANAFLEX) 2 MG tablet Take 1 tablet (2 mg) by mouth every 8 hours as needed for muscle spasms. 45 tablet 1     acetaminophen (TYLENOL) 500 MG tablet Take 2 tablets (1,000 mg) by mouth every 8 hours as needed for mild pain. 50 tablet 1     atorvastatin (LIPITOR) 80 MG tablet Take 80 mg by mouth daily.       Biotin-Vitamin C (HAIR SKIN NAILS GUMMIES) 1250-50 MCG-MG CHEW Take 1 chew tab by mouth daily.       Calcium Carb-Cholecalciferol (CALCIUM+D3 PO)  Take 1 tablet by mouth daily.       hydrOXYzine HCl (ATARAX) 10 MG tablet Take 1 tablet (10 mg) by mouth every 6 hours as needed for other (adjuvant pain). 45 tablet 0     metFORMIN (GLUCOPHAGE XR) 500 MG 24 hr tablet Take 500 mg by mouth daily (with dinner).       methocarbamol (ROBAXIN) 500 MG tablet Take 1 tablet (500 mg) by mouth 4 times daily as needed for muscle spasms (pain). 45 tablet 1     pantoprazole (PROTONIX) 40 MG EC tablet Take 1 tablet by mouth daily as needed for heartburn.       senna-docusate (SENOKOT-S/PERICOLACE) 8.6-50 MG tablet Take 2 tablets by mouth 2 times daily as needed for constipation (while on narcotics). 45 tablet 0     No current facility-administered medications for this visit.       Allergies   Allergen Reactions     Aspirin Anaphylaxis       Social History     Occupational History     Not on file   Tobacco Use     Smoking status: Former     Types: Cigarettes     Smokeless tobacco: Never   Substance and Sexual Activity     Alcohol use: Yes     Comment: occ     Drug use: Not on file     Sexual activity: Not on file       No family history on file.    REVIEW OF SYSTEMS  General: negative for, night sweats, dizziness, fatigue  Resp: No shortness of breath and no cough  CV: negative for chest pain, syncope or near-syncope  GI: negative for nausea, vomiting and diarrhea  : negative for dysuria and hematuria  Musculoskeletal: as above  Neurologic: negative for syncope   Hematologic: negative for bleeding disorder    Physical Exam:  Vitals: There were no vitals taken for this visit.  BMI= There is no height or weight on file to calculate BMI.  Constitutional: healthy, alert and no acute distress   Psychiatric: mentation appears normal and affect normal/bright  NEURO: no focal deficits  SKIN: .healing well, well approximated skin edges, without signs of infection including no erythema, incision breakdown or purlent drainage  JOINT/EXTREMITIES: Minimal edema distally.  Distal  neurovascular intact.  Able to go from a seated to stand position with no difficulty.  GAIT: not tested     Diagnostic Modalities:  Right hip x-rays: Weightbearing views taken in the clinic shows a short locked gamma nail in place.  Intertrochanteric hip fracture with lucency present.  Nondisplaced.  No change in hardware alignment.  Independent visualization of the images was performed.      Impression: No chief complaint on file.  April 12, 2025 surgery: Right hip intertrochanteric cephalomedullary nail fixation    Plan:   Doing well.  Continue working with physical therapy.  Being discharged out of the nursing home today.  Plan to see her back in 4 weeks.  Eliquis prescription provided.  Return to clinic 4, week(s), or sooner as needed for changes.    Re-x-ray on return: Yes.  Weightbearing right hip    Nile Oseguera D.O.      Again, thank you for allowing me to participate in the care of your patient.        Sincerely,        Jeffery Oseguera, DO    Electronically signed

## 2025-04-24 ENCOUNTER — TELEPHONE (OUTPATIENT)
Dept: ORTHOPEDICS | Facility: CLINIC | Age: 70
End: 2025-04-24
Payer: COMMERCIAL

## 2025-04-24 ENCOUNTER — DOCUMENTATION ONLY (OUTPATIENT)
Dept: OTHER | Facility: CLINIC | Age: 70
End: 2025-04-24
Payer: COMMERCIAL

## 2025-04-24 NOTE — TELEPHONE ENCOUNTER
Other[facility] called and is wonder if the provider will follow for home health care. Please call her back with info.     Could we send this information to you in [employer] or would you prefer to receive a phone call?:   Patient would prefer a phone call   Okay to leave a detailed message?: Yes at Other phone number:  [phone]  opt 2*

## 2025-04-28 NOTE — TELEPHONE ENCOUNTER
Attempted to call Birgit, no answer. Left voicemail with provider's message and requested she call back at [phone] if there are any additional questions.       Jacqueline Ortez RN   [facility]

## 2025-04-28 NOTE — TELEPHONE ENCOUNTER
Other: Birgit is calling on behalf of the patient. Would like to know if Dr. Oseguera would be able to sign paperwork for patient to start home health care.      Could we send this information to you in [employer] or would you prefer to receive a phone call?:   Patient would prefer a phone call   Okay to leave a detailed message?: Yes at Other phone number:  [phone]

## 2025-05-15 DIAGNOSIS — Z98.890 S/P ORIF (OPEN REDUCTION INTERNAL FIXATION) FRACTURE: Primary | ICD-10-CM

## 2025-05-15 DIAGNOSIS — Z87.81 S/P ORIF (OPEN REDUCTION INTERNAL FIXATION) FRACTURE: Primary | ICD-10-CM

## 2025-05-21 NOTE — PROGRESS NOTES
Orthopedic Clinic Post-Operative Note    CHIEF COMPLAINT:   Chief Complaint   Patient presents with    Right Hip - Surgical Followup     S/p Right hip intertrochanteric cephalomedullary nail fixation, DOS: 4/12/2025 (5w5d)       HISTORY OF PRESENT ILLNESS  Today's visit:  Returns. Doing well. No pain. Getting around home.  No incision concerns. Has home exercises. Not attending therapy currently.    April 23, 2025 office visit:  Returns with her daughter today.  Pain is improving.  Therapies been working well.  Going home today.        April 12, 2025 surgery: Right hip intertrochanteric cephalomedullary nail fixation       Patient's past medical, surgical, social and family histories reviewed.     No past medical history on file.    Past Surgical History:   Procedure Laterality Date    OPEN REDUCTION INTERNAL FIXATION HIP NAILING Right 4/12/2025    Procedure: INTERNAL FIXATION, FRACTURE, TROCHANTERIC, RIGHT HIP, USING PINS;  Surgeon: Jeffery Oseguera DO;  Location: [facility] OR       Medications:  Current Outpatient Medications   Medication Sig Dispense Refill    acetaminophen (TYLENOL) 500 MG tablet Take 2 tablets (1,000 mg) by mouth every 8 hours as needed for mild pain or other (and adjunct with moderate or severe pain or per patient request). 70 tablet 0    methocarbamol (ROBAXIN) 500 MG tablet Take 1 tablet (500 mg) by mouth 4 times daily as needed for muscle spasms (pain). 45 tablet 1    oxyCODONE (ROXICODONE) 5 MG tablet Take 1 tablet (5 mg) by mouth every 4 hours as needed for moderate pain. 30 tablet 0    acetaminophen (TYLENOL) 500 MG tablet Take 2 tablets (1,000 mg) by mouth every 8 hours as needed for mild pain. 50 tablet 1    atorvastatin (LIPITOR) 80 MG tablet Take 80 mg by mouth daily.      Biotin-Vitamin C (HAIR SKIN NAILS GUMMIES) 1250-50 MCG-MG CHEW Take 1 chew tab by mouth daily.      Calcium Carb-Cholecalciferol (CALCIUM+D3 PO) Take 1 tablet by mouth daily.      hydrOXYzine HCl (ATARAX) 10 MG  tablet Take 1 tablet (10 mg) by mouth every 6 hours as needed for other (adjuvant pain). 45 tablet 0    metFORMIN (GLUCOPHAGE XR) 500 MG 24 hr tablet Take 500 mg by mouth daily (with dinner).      pantoprazole (PROTONIX) 40 MG EC tablet Take 1 tablet by mouth daily as needed for heartburn.      senna-docusate (SENOKOT-S/PERICOLACE) 8.6-50 MG tablet Take 2 tablets by mouth 2 times daily as needed for constipation (while on narcotics). 45 tablet 0    tiZANidine (ZANAFLEX) 2 MG tablet Take 1 tablet (2 mg) by mouth every 8 hours as needed for muscle spasms. (Patient not taking: Reported on 5/22/2025) 45 tablet 1     No current facility-administered medications for this visit.       Allergies   Allergen Reactions    Aspirin Anaphylaxis       Social History     Occupational History    Not on file   Tobacco Use    Smoking status: Former     Types: Cigarettes    Smokeless tobacco: Never   Substance and Sexual Activity    Alcohol use: Yes     Comment: occ    Drug use: Not on file    Sexual activity: Not on file       No family history on file.    REVIEW OF SYSTEMS  General: negative for, night sweats, dizziness, fatigue  Resp: No shortness of breath and no cough  CV: negative for chest pain, syncope or near-syncope  GI: negative for nausea, vomiting and diarrhea  : negative for dysuria and hematuria  Musculoskeletal: as above  Neurologic: negative for syncope   Hematologic: negative for bleeding disorder    Physical Exam:  Constitutional: healthy, alert and no acute distress   Psychiatric: mentation appears normal and affect normal/bright  NEURO: no focal deficits  SKIN: .well healed, no erythema, no incision breakdown and no drainage.  JOINT/EXTREMITIES: right hip: no bruising, swelling, or tenderness. Quick sit to stand without pain. Calf soft non-tender.   GAIT: antalgic    Diagnostic Modalities:  Right hip x-rays: 2 views taken in the clinic shows short locked gamma nail in place.  No evidence of hardware failure  position changes.  No cut out.  Decrease lucency at the fracture site.  Independent visualization of the images was performed.      Impression:   Chief Complaint   Patient presents with    Right Hip - Surgical Followup     S/p Right hip intertrochanteric cephalomedullary nail fixation, DOS: 4/12/2025 (5w5d)   April 12, 2025 surgery: Right hip intertrochanteric cephalomedullary nail fixation (5 weeks 5 days)    Plan:   Activity: may continue to progress as tolerated. Discussed her limp, discussed therapy. She declines at this time. She feels making progress with her home exercises and would like to continue to progress at home.  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 She feels ready and able. She feels returning to work and feels she would be able to do all the duties. Discussed avoiding painful activities and being safe with the hip. If she notices that is not able to do the work or has concerns or pain with it to contact the clinic and provide another letter.  She also reports feels safe with driving and this is going well.     She can call anytime if she would like outpatient physical therapy.     Return to clinic 6 weeks, or sooner as needed for changes.    Re-x-ray on return: Yes.    Dr. Oseguera was present in the office.  He personally discussed the care plan and reviewed all appropriate imaging.    JOSÉ Douglas, CNP  Orthopedic Surgery

## 2025-05-22 ENCOUNTER — OFFICE VISIT (OUTPATIENT)
Dept: ORTHOPEDICS | Facility: CLINIC | Age: 70
End: 2025-05-22
Payer: COMMERCIAL

## 2025-05-22 ENCOUNTER — ANCILLARY PROCEDURE (OUTPATIENT)
Dept: GENERAL RADIOLOGY | Facility: CLINIC | Age: 70
End: 2025-05-22
Attending: NURSE PRACTITIONER
Payer: COMMERCIAL

## 2025-05-22 DIAGNOSIS — Z98.890 S/P ORIF (OPEN REDUCTION INTERNAL FIXATION) FRACTURE: Primary | ICD-10-CM

## 2025-05-22 DIAGNOSIS — Z87.81 S/P ORIF (OPEN REDUCTION INTERNAL FIXATION) FRACTURE: ICD-10-CM

## 2025-05-22 DIAGNOSIS — Z87.81 S/P ORIF (OPEN REDUCTION INTERNAL FIXATION) FRACTURE: Primary | ICD-10-CM

## 2025-05-22 DIAGNOSIS — Z98.890 S/P ORIF (OPEN REDUCTION INTERNAL FIXATION) FRACTURE: ICD-10-CM

## 2025-05-22 PROCEDURE — 73502 X-RAY EXAM HIP UNI 2-3 VIEWS: CPT | Mod: TC | Performed by: RADIOLOGY

## 2025-05-22 NOTE — LETTER
May 22, 2025      Sabina Rm  [id number] [address] [place] [address]        To Whom It May Concern:    Sabina Rm was seen in our clinic. She may return to work without restrictions beginning 5/25/2025.      Sincerely,        Nile Oseguera D.O.

## 2025-05-22 NOTE — LETTER
May 22, 2025      Sabina Rm  [id number] [address] [place], [place] [id number]        To Whom It May Concern:    Sabina Rm was seen in our clinic. She may return to work without restrictions beginning 5/25/2025.      Sincerely,        Santy Xie, CNP

## 2025-05-22 NOTE — LETTER
5/22/2025      Sabina Rm  [address] [address] [place] [id number]      Dear Colleague,    Thank you for referring your patient, Sabina Rm, to the [facility]. Please see a copy of my visit note below.    Orthopedic Clinic Post-Operative Note    CHIEF COMPLAINT:   Chief Complaint   Patient presents with     Right Hip - Surgical Followup     S/p Right hip intertrochanteric cephalomedullary nail fixation, DOS: 4/12/2025 (5w5d)       HISTORY OF PRESENT ILLNESS  Today's visit:  Returns. Doing well. No pain. Getting around home.  No incision concerns. Has home exercises. Not attending therapy currently.    April 23, 2025 office visit:  Returns with her daughter today.  Pain is improving.  Therapies been working well.  Going home today.        April 12, 2025 surgery: Right hip intertrochanteric cephalomedullary nail fixation       Patient's past medical, surgical, social and family histories reviewed.     No past medical history on file.    Past Surgical History:   Procedure Laterality Date     OPEN REDUCTION INTERNAL FIXATION HIP NAILING Right 4/12/2025    Procedure: INTERNAL FIXATION, FRACTURE, TROCHANTERIC, RIGHT HIP, USING PINS;  Surgeon: Jeffery Oseguera DO;  Location:  OR       Medications:  Current Outpatient Medications   Medication Sig Dispense Refill     acetaminophen (TYLENOL) 500 MG tablet Take 2 tablets (1,000 mg) by mouth every 8 hours as needed for mild pain or other (and adjunct with moderate or severe pain or per patient request). 70 tablet 0     methocarbamol (ROBAXIN) 500 MG tablet Take 1 tablet (500 mg) by mouth 4 times daily as needed for muscle spasms (pain). 45 tablet 1     oxyCODONE (ROXICODONE) 5 MG tablet Take 1 tablet (5 mg) by mouth every 4 hours as needed for moderate pain. 30 tablet 0     acetaminophen (TYLENOL) 500 MG tablet Take 2 tablets (1,000 mg) by mouth every 8 hours as needed for mild pain. 50 tablet 1     atorvastatin (LIPITOR)  80 MG tablet Take 80 mg by mouth daily.       Biotin-Vitamin C (HAIR SKIN NAILS GUMMIES) 1250-50 MCG-MG CHEW Take 1 chew tab by mouth daily.       Calcium Carb-Cholecalciferol (CALCIUM+D3 PO) Take 1 tablet by mouth daily.       hydrOXYzine HCl (ATARAX) 10 MG tablet Take 1 tablet (10 mg) by mouth every 6 hours as needed for other (adjuvant pain). 45 tablet 0     metFORMIN (GLUCOPHAGE XR) 500 MG 24 hr tablet Take 500 mg by mouth daily (with dinner).       pantoprazole (PROTONIX) 40 MG EC tablet Take 1 tablet by mouth daily as needed for heartburn.       senna-docusate (SENOKOT-S/PERICOLACE) 8.6-50 MG tablet Take 2 tablets by mouth 2 times daily as needed for constipation (while on narcotics). 45 tablet 0     tiZANidine (ZANAFLEX) 2 MG tablet Take 1 tablet (2 mg) by mouth every 8 hours as needed for muscle spasms. (Patient not taking: Reported on 5/22/2025) 45 tablet 1     No current facility-administered medications for this visit.       Allergies   Allergen Reactions     Aspirin Anaphylaxis       Social History     Occupational History     Not on file   Tobacco Use     Smoking status: Former     Types: Cigarettes     Smokeless tobacco: Never   Substance and Sexual Activity     Alcohol use: Yes     Comment: occ     Drug use: Not on file     Sexual activity: Not on file       No family history on file.    REVIEW OF SYSTEMS  General: negative for, night sweats, dizziness, fatigue  Resp: No shortness of breath and no cough  CV: negative for chest pain, syncope or near-syncope  GI: negative for nausea, vomiting and diarrhea  : negative for dysuria and hematuria  Musculoskeletal: as above  Neurologic: negative for syncope   Hematologic: negative for bleeding disorder    Physical Exam:  Constitutional: healthy, alert and no acute distress   Psychiatric: mentation appears normal and affect normal/bright  NEURO: no focal deficits  SKIN: .well healed, no erythema, no incision breakdown and no drainage.  JOINT/EXTREMITIES:  right hip: no bruising, swelling, or tenderness. Quick sit to stand without pain. Calf soft non-tender.   GAIT: antalgic    Diagnostic Modalities:  Right hip x-rays: 2 views taken in the clinic shows short locked gamma nail in place.  No evidence of hardware failure position changes.  No cut out.  Decrease lucency at the fracture site.  Independent visualization of the images was performed.      Impression:   Chief Complaint   Patient presents with     Right Hip - Surgical Followup     S/p Right hip intertrochanteric cephalomedullary nail fixation, DOS: 4/12/2025 (5w5d)   April 12, 2025 surgery: Right hip intertrochanteric cephalomedullary nail fixation (5 weeks 5 days)    Plan:   Activity: may continue to progress as tolerated. Discussed her limp, discussed therapy. She declines at this time. She feels making progress with her home exercises and would like to continue to progress at home.  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 She feels ready and able. She feels returning to work and feels she would be able to do all the duties. Discussed avoiding painful activities and being safe with the hip. If she notices that is not able to do the work or has concerns or pain with it to contact the clinic and provide another letter.  She also reports feels safe with driving and this is going well.     She can call anytime if she would like outpatient physical therapy.     Return to clinic 6 weeks, or sooner as needed for changes.    Re-x-ray on return: Yes.    Dr. Oseguera was present in the office.  He personally discussed the care plan and reviewed all appropriate imaging.    JOSÉ Douglas, CNP  Orthopedic Surgery      Again, thank you for allowing me to participate in the care of your patient.        Sincerely,        Jeffery Oseguera, DO    Electronically signed

## (undated) DEVICE — DRAPE IOBAN ISOLATION VERTICAL 320X21CM 6617

## (undated) DEVICE — SU DERMABOND ADVANCED .7ML DNX12

## (undated) DEVICE — SU VICRYL 0 OS-6 18" UND J754T

## (undated) DEVICE — GLOVE BIOGEL PI ULTRATOUCH G SZ 7.5 42175

## (undated) DEVICE — SU VICRYL 2-0 CP-2 18" UND J762D

## (undated) DEVICE — GLOVE BIOGEL INDICATOR 7.5 LF 41675

## (undated) DEVICE — PACK MINOR PROCEDURE CUSTOM

## (undated) DEVICE — BLADE KNIFE SURG 10 371110

## (undated) DEVICE — DRSG AQUACEL AG HYDROFIBER 3.5X6" 422604

## (undated) DEVICE — GLOVE BIOGEL PI INDICATOR 8.0 LF 41680

## (undated) DEVICE — SYR BULB IRRIG DOVER 60 ML LATEX FREE 67000

## (undated) DEVICE — Device

## (undated) DEVICE — KIT PATIENT CARE HANA TABLE PROFX SUPINE 6855

## (undated) DEVICE — DRILL SRG 360MM 4.2MM T2 ALPHA LCK 2352-4236S

## (undated) DEVICE — SU MONOCRYL 4-0 PS-2 18" UND Y496G

## (undated) DEVICE — DRSG AQUACEL AG ADV 3.5X4" 422603

## (undated) DEVICE — SOL NACL 0.9% IRRIG 1000ML BOTTLE 07138-09

## (undated) DEVICE — GOWN IMPERVIOUS SPECIALTY XL/XLONG 39049

## (undated) DEVICE — PREP CHLORAPREP 26ML TINTED ORANGE  260815

## (undated) DEVICE — SOL WATER IRRIG 1000ML BOTTLE 07139-09

## (undated) DEVICE — SPONGE LAP 04X18" 1525

## (undated) RX ORDER — HYDROMORPHONE HYDROCHLORIDE 1 MG/ML
INJECTION, SOLUTION INTRAMUSCULAR; INTRAVENOUS; SUBCUTANEOUS
Status: DISPENSED
Start: 2025-04-12

## (undated) RX ORDER — FENTANYL CITRATE-0.9 % NACL/PF 10 MCG/ML
PLASTIC BAG, INJECTION (ML) INTRAVENOUS
Status: DISPENSED
Start: 2025-04-12

## (undated) RX ORDER — FENTANYL CITRATE 50 UG/ML
INJECTION, SOLUTION INTRAMUSCULAR; INTRAVENOUS
Status: DISPENSED
Start: 2025-04-12

## (undated) RX ORDER — CEFAZOLIN SODIUM/WATER 2 G/20 ML
SYRINGE (ML) INTRAVENOUS
Status: DISPENSED
Start: 2025-04-12

## (undated) RX ORDER — BUPIVACAINE HYDROCHLORIDE AND EPINEPHRINE 2.5; 5 MG/ML; UG/ML
INJECTION, SOLUTION EPIDURAL; INFILTRATION; INTRACAUDAL; PERINEURAL
Status: DISPENSED
Start: 2025-04-12

## (undated) RX ORDER — PROPOFOL 10 MG/ML
INJECTION, EMULSION INTRAVENOUS
Status: DISPENSED
Start: 2025-04-12

## (undated) RX ORDER — DEXAMETHASONE SODIUM PHOSPHATE 10 MG/ML
INJECTION, SOLUTION INTRAMUSCULAR; INTRAVENOUS
Status: DISPENSED
Start: 2025-04-12